# Patient Record
Sex: MALE | Race: WHITE | NOT HISPANIC OR LATINO | URBAN - METROPOLITAN AREA
[De-identification: names, ages, dates, MRNs, and addresses within clinical notes are randomized per-mention and may not be internally consistent; named-entity substitution may affect disease eponyms.]

---

## 2023-01-01 ENCOUNTER — INPATIENT (INPATIENT)
Facility: HOSPITAL | Age: 83
LOS: 5 days | Discharge: HOME | DRG: 982 | End: 2023-01-07
Attending: THORACIC SURGERY (CARDIOTHORACIC VASCULAR SURGERY) | Admitting: THORACIC SURGERY (CARDIOTHORACIC VASCULAR SURGERY)
Payer: MEDICARE

## 2023-01-01 VITALS
HEART RATE: 60 BPM | RESPIRATION RATE: 19 BRPM | SYSTOLIC BLOOD PRESSURE: 128 MMHG | DIASTOLIC BLOOD PRESSURE: 64 MMHG | OXYGEN SATURATION: 96 % | TEMPERATURE: 98 F

## 2023-01-01 DIAGNOSIS — Z98.890 OTHER SPECIFIED POSTPROCEDURAL STATES: Chronic | ICD-10-CM

## 2023-01-01 DIAGNOSIS — I48.0 PAROXYSMAL ATRIAL FIBRILLATION: ICD-10-CM

## 2023-01-01 DIAGNOSIS — J93.83 OTHER PNEUMOTHORAX: ICD-10-CM

## 2023-01-01 DIAGNOSIS — R77.8 OTHER SPECIFIED ABNORMALITIES OF PLASMA PROTEINS: ICD-10-CM

## 2023-01-01 DIAGNOSIS — J18.9 PNEUMONIA, UNSPECIFIED ORGANISM: ICD-10-CM

## 2023-01-01 PROBLEM — Z00.00 ENCOUNTER FOR PREVENTIVE HEALTH EXAMINATION: Status: ACTIVE | Noted: 2023-01-01

## 2023-01-01 LAB
ABO RH CONFIRMATION: SIGNIFICANT CHANGE UP
ALBUMIN SERPL ELPH-MCNC: 3.8 G/DL — SIGNIFICANT CHANGE UP (ref 3.3–5.2)
ALP SERPL-CCNC: 88 U/L — SIGNIFICANT CHANGE UP (ref 40–120)
ALT FLD-CCNC: 12 U/L — SIGNIFICANT CHANGE UP
ANION GAP SERPL CALC-SCNC: 7 MMOL/L — SIGNIFICANT CHANGE UP (ref 5–17)
APPEARANCE UR: CLEAR — SIGNIFICANT CHANGE UP
APTT BLD: 32.6 SEC — SIGNIFICANT CHANGE UP (ref 27.5–35.5)
AST SERPL-CCNC: 19 U/L — SIGNIFICANT CHANGE UP
B PERT IGG+IGM PNL SER: ABNORMAL
BACTERIA # UR AUTO: ABNORMAL
BILIRUB SERPL-MCNC: 0.6 MG/DL — SIGNIFICANT CHANGE UP (ref 0.4–2)
BILIRUB UR-MCNC: NEGATIVE — SIGNIFICANT CHANGE UP
BLD GP AB SCN SERPL QL: SIGNIFICANT CHANGE UP
BUN SERPL-MCNC: 28.3 MG/DL — HIGH (ref 8–20)
CALCIUM SERPL-MCNC: 8.6 MG/DL — SIGNIFICANT CHANGE UP (ref 8.4–10.5)
CHLORIDE SERPL-SCNC: 101 MMOL/L — SIGNIFICANT CHANGE UP (ref 96–108)
CO2 SERPL-SCNC: 28 MMOL/L — SIGNIFICANT CHANGE UP (ref 22–29)
COLOR FLD: ABNORMAL
COLOR SPEC: YELLOW — SIGNIFICANT CHANGE UP
CREAT SERPL-MCNC: 1.1 MG/DL — SIGNIFICANT CHANGE UP (ref 0.5–1.3)
D DIMER BLD IA.RAPID-MCNC: 440 NG/ML DDU — HIGH
DIFF PNL FLD: NEGATIVE — SIGNIFICANT CHANGE UP
EGFR: 67 ML/MIN/1.73M2 — SIGNIFICANT CHANGE UP
EOSINOPHIL # FLD: 1 % — SIGNIFICANT CHANGE UP
EPI CELLS # UR: SIGNIFICANT CHANGE UP
FLUID INTAKE SUBSTANCE CLASS: SIGNIFICANT CHANGE UP
GLUCOSE SERPL-MCNC: 97 MG/DL — SIGNIFICANT CHANGE UP (ref 70–99)
GLUCOSE UR QL: NEGATIVE MG/DL — SIGNIFICANT CHANGE UP
GRAM STN FLD: SIGNIFICANT CHANGE UP
HCT VFR BLD CALC: 24.8 % — LOW (ref 39–50)
HCT VFR BLD CALC: 27.2 % — LOW (ref 39–50)
HGB BLD-MCNC: 8.1 G/DL — LOW (ref 13–17)
HGB BLD-MCNC: 8.8 G/DL — LOW (ref 13–17)
INR BLD: 1.24 RATIO — HIGH (ref 0.88–1.16)
KETONES UR-MCNC: NEGATIVE — SIGNIFICANT CHANGE UP
LACTATE SERPL-SCNC: 0.7 MMOL/L — SIGNIFICANT CHANGE UP (ref 0.5–2)
LEUKOCYTE ESTERASE UR-ACNC: ABNORMAL
LYMPHOCYTES # FLD: 8 % — SIGNIFICANT CHANGE UP
MAGNESIUM SERPL-MCNC: 2.3 MG/DL — SIGNIFICANT CHANGE UP (ref 1.8–2.6)
MCHC RBC-ENTMCNC: 31.2 PG — SIGNIFICANT CHANGE UP (ref 27–34)
MCHC RBC-ENTMCNC: 32.1 PG — SIGNIFICANT CHANGE UP (ref 27–34)
MCHC RBC-ENTMCNC: 32.4 GM/DL — SIGNIFICANT CHANGE UP (ref 32–36)
MCHC RBC-ENTMCNC: 32.7 GM/DL — SIGNIFICANT CHANGE UP (ref 32–36)
MCV RBC AUTO: 96.5 FL — SIGNIFICANT CHANGE UP (ref 80–100)
MCV RBC AUTO: 98.4 FL — SIGNIFICANT CHANGE UP (ref 80–100)
MESOTHL CELL # FLD: 2 % — SIGNIFICANT CHANGE UP
MONOS+MACROS # FLD: 11 % — SIGNIFICANT CHANGE UP
NEUTROPHILS-BODY FLUID: 78 % — SIGNIFICANT CHANGE UP
NITRITE UR-MCNC: NEGATIVE — SIGNIFICANT CHANGE UP
PH FLD: 8 — SIGNIFICANT CHANGE UP
PH UR: 5 — SIGNIFICANT CHANGE UP (ref 5–8)
PLATELET # BLD AUTO: 175 K/UL — SIGNIFICANT CHANGE UP (ref 150–400)
PLATELET # BLD AUTO: 204 K/UL — SIGNIFICANT CHANGE UP (ref 150–400)
POTASSIUM SERPL-MCNC: 4.7 MMOL/L — SIGNIFICANT CHANGE UP (ref 3.5–5.3)
POTASSIUM SERPL-SCNC: 4.7 MMOL/L — SIGNIFICANT CHANGE UP (ref 3.5–5.3)
PROCALCITONIN SERPL-MCNC: 0.06 NG/ML — SIGNIFICANT CHANGE UP (ref 0.02–0.1)
PROT SERPL-MCNC: 6.1 G/DL — LOW (ref 6.6–8.7)
PROT UR-MCNC: NEGATIVE — SIGNIFICANT CHANGE UP
PROTHROM AB SERPL-ACNC: 14.4 SEC — HIGH (ref 10.5–13.4)
RBC # BLD: 2.52 M/UL — LOW (ref 4.2–5.8)
RBC # BLD: 2.82 M/UL — LOW (ref 4.2–5.8)
RBC # FLD: 14.4 % — SIGNIFICANT CHANGE UP (ref 10.3–14.5)
RBC # FLD: 15.9 % — HIGH (ref 10.3–14.5)
RBC CASTS # UR COMP ASSIST: SIGNIFICANT CHANGE UP /HPF (ref 0–4)
RCV VOL RI: HIGH /UL (ref 0–0)
SODIUM SERPL-SCNC: 136 MMOL/L — SIGNIFICANT CHANGE UP (ref 135–145)
SP GR SPEC: 1.02 — SIGNIFICANT CHANGE UP (ref 1.01–1.02)
SPECIMEN SOURCE: SIGNIFICANT CHANGE UP
TOTAL NUCLEATED CELL COUNT, BODY FLUID: 3489 /UL — SIGNIFICANT CHANGE UP
TROPONIN T SERPL-MCNC: 0.01 NG/ML — SIGNIFICANT CHANGE UP (ref 0–0.06)
TSH SERPL-MCNC: 2.59 UIU/ML — SIGNIFICANT CHANGE UP (ref 0.27–4.2)
TUBE TYPE: SIGNIFICANT CHANGE UP
UROBILINOGEN FLD QL: NEGATIVE MG/DL — SIGNIFICANT CHANGE UP
WBC # BLD: 10.14 K/UL — SIGNIFICANT CHANGE UP (ref 3.8–10.5)
WBC # BLD: 8.23 K/UL — SIGNIFICANT CHANGE UP (ref 3.8–10.5)
WBC # FLD AUTO: 10.14 K/UL — SIGNIFICANT CHANGE UP (ref 3.8–10.5)
WBC # FLD AUTO: 8.23 K/UL — SIGNIFICANT CHANGE UP (ref 3.8–10.5)
WBC UR QL: ABNORMAL /HPF (ref 0–5)

## 2023-01-01 PROCEDURE — 93010 ELECTROCARDIOGRAM REPORT: CPT

## 2023-01-01 PROCEDURE — 32551 INSERTION OF CHEST TUBE: CPT | Mod: LT

## 2023-01-01 PROCEDURE — ZZZZZ: CPT

## 2023-01-01 PROCEDURE — 71045 X-RAY EXAM CHEST 1 VIEW: CPT | Mod: 26,76

## 2023-01-01 PROCEDURE — 99222 1ST HOSP IP/OBS MODERATE 55: CPT

## 2023-01-01 RX ORDER — FUROSEMIDE 40 MG
20 TABLET ORAL DAILY
Refills: 0 | Status: DISCONTINUED | OUTPATIENT
Start: 2023-01-01 | End: 2023-01-02

## 2023-01-01 RX ORDER — SODIUM CHLORIDE 9 MG/ML
3 INJECTION INTRAMUSCULAR; INTRAVENOUS; SUBCUTANEOUS EVERY 8 HOURS
Refills: 0 | Status: DISCONTINUED | OUTPATIENT
Start: 2023-01-01 | End: 2023-01-07

## 2023-01-01 RX ORDER — LIDOCAINE HCL 20 MG/ML
10 VIAL (ML) INJECTION ONCE
Refills: 0 | Status: COMPLETED | OUTPATIENT
Start: 2023-01-01 | End: 2023-01-01

## 2023-01-01 RX ORDER — ACETAMINOPHEN 500 MG
650 TABLET ORAL EVERY 6 HOURS
Refills: 0 | Status: DISCONTINUED | OUTPATIENT
Start: 2023-01-01 | End: 2023-01-07

## 2023-01-01 RX ORDER — OXYCODONE HYDROCHLORIDE 5 MG/1
5 TABLET ORAL EVERY 6 HOURS
Refills: 0 | Status: DISCONTINUED | OUTPATIENT
Start: 2023-01-01 | End: 2023-01-07

## 2023-01-01 RX ORDER — INFLUENZA VIRUS VACCINE 15; 15; 15; 15 UG/.5ML; UG/.5ML; UG/.5ML; UG/.5ML
0.7 SUSPENSION INTRAMUSCULAR ONCE
Refills: 0 | Status: DISCONTINUED | OUTPATIENT
Start: 2023-01-01 | End: 2023-01-07

## 2023-01-01 RX ORDER — SIMVASTATIN 20 MG/1
1 TABLET, FILM COATED ORAL
Qty: 0 | Refills: 0 | DISCHARGE

## 2023-01-01 RX ORDER — LISINOPRIL 2.5 MG/1
20 TABLET ORAL DAILY
Refills: 0 | Status: DISCONTINUED | OUTPATIENT
Start: 2023-01-01 | End: 2023-01-01

## 2023-01-01 RX ORDER — SIMVASTATIN 20 MG/1
20 TABLET, FILM COATED ORAL AT BEDTIME
Refills: 0 | Status: DISCONTINUED | OUTPATIENT
Start: 2023-01-01 | End: 2023-01-07

## 2023-01-01 RX ORDER — ACETAMINOPHEN 500 MG
1000 TABLET ORAL ONCE
Refills: 0 | Status: COMPLETED | OUTPATIENT
Start: 2023-01-01 | End: 2023-01-01

## 2023-01-01 RX ORDER — HYDROMORPHONE HYDROCHLORIDE 2 MG/ML
0.5 INJECTION INTRAMUSCULAR; INTRAVENOUS; SUBCUTANEOUS ONCE
Refills: 0 | Status: DISCONTINUED | OUTPATIENT
Start: 2023-01-01 | End: 2023-01-01

## 2023-01-01 RX ORDER — LISINOPRIL 2.5 MG/1
20 TABLET ORAL
Refills: 0 | Status: DISCONTINUED | OUTPATIENT
Start: 2023-01-01 | End: 2023-01-02

## 2023-01-01 RX ORDER — AMLODIPINE BESYLATE 2.5 MG/1
5 TABLET ORAL DAILY
Refills: 0 | Status: DISCONTINUED | OUTPATIENT
Start: 2023-01-01 | End: 2023-01-02

## 2023-01-01 RX ORDER — PANTOPRAZOLE SODIUM 20 MG/1
40 TABLET, DELAYED RELEASE ORAL
Refills: 0 | Status: DISCONTINUED | OUTPATIENT
Start: 2023-01-01 | End: 2023-01-07

## 2023-01-01 RX ORDER — LIDOCAINE 4 G/100G
1 CREAM TOPICAL ONCE
Refills: 0 | Status: COMPLETED | OUTPATIENT
Start: 2023-01-01 | End: 2023-01-01

## 2023-01-01 RX ADMIN — LISINOPRIL 20 MILLIGRAM(S): 2.5 TABLET ORAL at 17:07

## 2023-01-01 RX ADMIN — Medication 20 MILLIGRAM(S): at 15:18

## 2023-01-01 RX ADMIN — OXYCODONE HYDROCHLORIDE 5 MILLIGRAM(S): 5 TABLET ORAL at 17:46

## 2023-01-01 RX ADMIN — AMLODIPINE BESYLATE 5 MILLIGRAM(S): 2.5 TABLET ORAL at 11:45

## 2023-01-01 RX ADMIN — HYDROMORPHONE HYDROCHLORIDE 0.5 MILLIGRAM(S): 2 INJECTION INTRAMUSCULAR; INTRAVENOUS; SUBCUTANEOUS at 19:13

## 2023-01-01 RX ADMIN — LIDOCAINE 1 PATCH: 4 CREAM TOPICAL at 18:59

## 2023-01-01 RX ADMIN — Medication 650 MILLIGRAM(S): at 12:59

## 2023-01-01 RX ADMIN — SIMVASTATIN 20 MILLIGRAM(S): 20 TABLET, FILM COATED ORAL at 21:18

## 2023-01-01 RX ADMIN — SODIUM CHLORIDE 3 MILLILITER(S): 9 INJECTION INTRAMUSCULAR; INTRAVENOUS; SUBCUTANEOUS at 13:13

## 2023-01-01 RX ADMIN — Medication 1000 MILLIGRAM(S): at 19:29

## 2023-01-01 RX ADMIN — Medication 400 MILLIGRAM(S): at 19:14

## 2023-01-01 RX ADMIN — OXYCODONE HYDROCHLORIDE 5 MILLIGRAM(S): 5 TABLET ORAL at 16:46

## 2023-01-01 RX ADMIN — LIDOCAINE 1 PATCH: 4 CREAM TOPICAL at 17:54

## 2023-01-01 RX ADMIN — Medication 650 MILLIGRAM(S): at 13:59

## 2023-01-01 RX ADMIN — SODIUM CHLORIDE 3 MILLILITER(S): 9 INJECTION INTRAMUSCULAR; INTRAVENOUS; SUBCUTANEOUS at 21:16

## 2023-01-01 RX ADMIN — Medication 10 MILLILITER(S): at 10:46

## 2023-01-01 RX ADMIN — HYDROMORPHONE HYDROCHLORIDE 0.5 MILLIGRAM(S): 2 INJECTION INTRAMUSCULAR; INTRAVENOUS; SUBCUTANEOUS at 19:29

## 2023-01-01 RX ADMIN — PANTOPRAZOLE SODIUM 40 MILLIGRAM(S): 20 TABLET, DELAYED RELEASE ORAL at 11:45

## 2023-01-01 NOTE — CONSULT NOTE ADULT - ASSESSMENT
82M presented to [facility] via Ambulance on 12/29 with acute onset shortness of breath and left sided chest pain radiating down left arm.  He was hypoxic on admission requiring 3L NC, and relatively hypotensive with SBP in the 90's according to [place] chart.  Cardiac workup revealed mildly elevated troponin likely secondary to demand ischemia in the setting of what was presumed pneumonia, with no abnormality on EKG except for RBBB and rate controlled afib.  CTangio chest r/o PE but revealed left sided effusion for which a thoracentesis was performed, yielding 300cc of jd blood.  Thoracic surgery was consulted and the patient was transferred to [facility] for management of spontaneous hemothorax.  He is on eliquis for chronic persistent atrial fibrillation.  He denies any sort of trauma to account for an etiology of this hemothorax (he was sitting down drinking coffee when initial symptoms began).     Appreciate above HPI. 82 y/op M with PMH HTN, heart failure unspecified, HLD, mitral ring, Afib on EliquisPt sitting up in bed, awaiting chest tube by CTsx. States history of MVR, possible clip to [place]. States no history of heart attack, stent, PCI in past.

## 2023-01-01 NOTE — H&P ADULT - HISTORY OF PRESENT ILLNESS
82M presented to [facility] via Ambulance on 12/29 with acute onset shortness of breath and left sided chest pain radiating down left arm.  He was hypoxic on admission requiring 3L NC, and relatively hypotensive with SBP in the 90's according to [place] chart.   Cardiac workup revealed mildly elevated troponin likely secondary to demand ischemia in the setting of what was presumed pneumonia, with no abnormality on EKG except for RBBB and rate controlled afib.  CTangio chest r/o PE but revealed left sided effusion for which a thoracentesis was performed, yielding 300cc of jd blood.  Thoracic surgery was consulted and the patient was transferred to [facility] for management of spontaneous hemothorax.  He is on eliquis for chronic persistent atrial fibrillation.  He denies any sort of trauma to account for an etiology of this hemothorax (he was sitting down drinking coffee when initial symptoms began).  He denies back pain, abdominal pain, dyspepsia, headache, syncope, visual changes, motor dysfunction/weakness, dysuria, dysphagia, cough, hemoptysis, palpitations, N/V/D.  He is currently in no acute distress, with 1/10 chest pain, and no SOB at rest.

## 2023-01-01 NOTE — H&P ADULT - NSHPLABSRESULTS_GEN_ALL_CORE
Verbal report on ECHO from [facility] - possible mitral valve abnormality, ?vegetation  partially loculated L effusion on CT Chest

## 2023-01-01 NOTE — CONSULT NOTE ADULT - PROBLEM SELECTOR RECOMMENDATION 2
.  - SR/SB on monitor   - no BB 2/2 bradycardia   - Previously on Eliquis, now with spontaneous hemothorax   - CHADSVASC (age, HTN) 3.   - would resume when cleared by thoracic surgery

## 2023-01-01 NOTE — CONSULT NOTE ADULT - NS ATTEND AMEND GEN_ALL_CORE FT
seen with above,    82M history significant for HTN, HLD, MV repair (at [facility] with Dr. Ayoub), chronic Afib (on Eliquis) follows with outside cardiologists Dr. Cox/Sanjuana at [facility], presents to [facility] with acute dyspnea/L-chest/arm pain found with large hemothorax, reportedly had elevated Troponin, transfer here for thoracic surgery eval. s/p chest tube with significant bloody fluid  -CHADSVasc 3, no prior CVA, need to hold AC in the interim as unexplained spontaneous hemothorax, consider future elective TOOTIE occlusion procedure; patient wants to switch to different cardiologist can follow up with [facility] closer to where he lives  -Troponin normal, EKG with RBBB, defer ischemic workup to outpatient when hemothorax resolve  -TTE pending, if normal then no cardiac contraindication if need OR for decortication  -reconsult if new cardiac issue arise         Poncho Rollins DO, [facility]  Faculty Non-Invasive Cardiologist  [phone]

## 2023-01-01 NOTE — H&P ADULT - ASSESSMENT
82M transferred from [facility] with spontaneous hemothorax on eliquis.    -Admit to Dr. Barney's service  -labs, ekg, cxr,   -chest tube insertion for drainage of hemothorax this AM  -Monitor H/H  -repeat echo to better characterize ventricular function and mitral valve.    -will discuss with thoracic team in AM

## 2023-01-01 NOTE — PROCEDURE NOTE - NSINFORMCONSENT_GEN_A_CORE
Benefits, risks, and possible complications of procedure explained to patient/caregiver who verbalized understanding and gave written consent.
weight-bearing as tolerated

## 2023-01-01 NOTE — CONSULT NOTE ADULT - PROBLEM SELECTOR RECOMMENDATION 9
.  - elevated troponin at [facility]  - Troponin here neg  - would send repeat trop with CK  - EKG- non ischemic   - follows with Dr. Cox [place] cardiology   - given spontaneous pneumothorax, will defer further ischemic work up at this time.   - follow up with Dr. Cox after discharge  - cont statin

## 2023-01-01 NOTE — CONSULT NOTE ADULT - SUBJECTIVE AND OBJECTIVE BOX
[facility]                                              CARDIOLOGY AT [facility]                                                   [address], [place] [address]                                             Telephone: [phone]. Fax:[phone]                                                       CARDIOLOGY CONSULTATION NOTE                                                                                             History obtained by: Patient and medical record  Community Cardiologist: Joe Campa)  Reason for Consultation: Demand Ischemia   Available out pt records reviewed: Yes [  ] No [ x ]      Chief complaint:    Patient is a 82y old  Male who presents with a chief complaint of Hemothorax (2023 02:08)      HPI:  82M presented to [facility] via Ambulance on  with acute onset shortness of breath and left sided chest pain radiating down left arm.  He was hypoxic on admission requiring 3L NC, and relatively hypotensive with SBP in the 90's according to [place] chart.  Cardiac workup revealed mildly elevated troponin likely secondary to demand ischemia in the setting of what was presumed pneumonia, with no abnormality on EKG except for RBBB and rate controlled afib.  CTangio chest r/o PE but revealed left sided effusion for which a thoracentesis was performed, yielding 300cc of jd blood.  Thoracic surgery was consulted and the patient was transferred to [facility] for management of spontaneous hemothorax.  He is on eliquis for chronic persistent atrial fibrillation.  He denies any sort of trauma to account for an etiology of this hemothorax (he was sitting down drinking coffee when initial symptoms began).     Appreciate above HPI. 82 y/op M with PMH HTN, heart failure unspecified, HLD, mitral ring, Afib on EliquisPt sitting up in bed, awaiting chest tube by CTsx. States history of MVR, possible clip to [place]. States no history of heart attack, stent, PCI in past.         PAST MEDICAL HISTORY  HTN (hypertension)  HLD (hyperlipidemia)        PAST SURGICAL HISTORY  H/O mitral valve repair  H/O hernia repair      SOCIAL HISTORY:  Denies smoking/drugs  bourbon once a week       FAMILY HISTORY:  denies         HOME MEDICATIONS:  Eliquis 5 mg oral tablet: 1 tab(s) orally 2 times a day (2023 08:03)  Lasix 20 mg oral tablet: 1 tab(s) orally once a day (2023 08:03)  lisinopril 20 mg oral tablet: 1 tab(s) orally 2 times a day (2023 08:03)  Norvasc 5 mg oral tablet: 1 tab(s) orally once a day (2023 08:03)  oxyCODONE 5 mg oral tablet: 1 tab(s) orally every 6 hours (2023 08:03)  simvastatin 20 mg oral tablet: 1 tab(s) orally once a day (at bedtime) (2023 08:03)      CURRENT CARDIAC MEDICATIONS:  amLODIPine Tablet 5 milliGRAM(s) Oral daily  furosemide Tablet 20 milliGRAM(s) Oral daily  lisinopril 20 milliGRAM(s) Oral two times a day      CURRENT OTHER MEDICATIONS:  acetaminophen Tablet .. 650 milliGRAM(s) Oral every 6 hours PRN Temp greater or equal to 38.5C (101.3F), Mild Pain (1 - 3)  oxyCODONE IR 5 milliGRAM(s) Oral every 6 hours PRN Severe Pain (7 - 10)  pantoprazole Tablet 40 milliGRAM(s) Oral before breakfast  influenza Vaccine (HIGH DOSE) 0.7 milliLiter(s) IntraMuscular once  lidocaine 1% Injectable 10 milliLiter(s) Local Injection once, Stop order after: 1 Doses  simvastatin 20 milliGRAM(s) Oral at bedtime  sodium chloride 0.9% lock flush 3 milliLiter(s) IV Push every 8 hours        ALLERGIES:   spironolactone (Rash)        REVIEW OF SYMPTOMS:   CONSTITUTIONAL: No fever, no chills, no weight loss, no weight gain, no fatigue   ENMT:  No vertigo; No sinus or throat pain  NECK: No pain or stiffness  CARDIOVASCULAR: No chest pain, + dyspnea, no syncope/presyncope, no palpitations, no dizziness  RESPIRATORY: + Shortness of breath, no cough, no wheezing  : No dysuria, no hematuria   GI: No dark color stool, no nausea, no diarrhea, no constipation, no abdominal pain   NEURO: No headache, no slurred speech   MUSCULOSKELETAL: No joint pain or swelling; No muscle, back, or extremity pain  PSYCH: No agitation, no anxiety.    ALL OTHER REVIEW OF SYSTEMS ARE NEGATIVE.        VITAL SIGNS:  T(C): 36.6 (23 @ 08:30), Max: 36.8 (23 @ 01:13)  T(F): 97.9 (23 @ 08:30), Max: 98.3 (23 @ 01:13)  HR: 61 (23 @ 08:30) (60 - 61)  BP: 131/68 (23 @ 08:30) (128/64 - 131/68)  RR: 18 (23 @ 08:30) (18 - 19)  SpO2: 95% (23 @ 08:30) (95% - 96%)        INTAKE AND OUTPUT:     12-31 @ 07:01  -   @ 07:00  --------------------------------------------------------  IN: 120 mL / OUT: 0 mL / NET: 120 mL        PHYSICAL EXAM:  Constitutional: Comfortable . No acute distress.   HEENT: Atraumatic and normocephalic , neck is supple   CNS: A&Ox3. No focal deficits.   Respiratory: RLL diminished, supplemental o2  Cardiovascular: RRR normal s1 s2. No murmur. No rubs or gallop.  Gastrointestinal: Soft, non-tender. +Bowel sounds.   Extremities: No edema  Psychiatric: Calm . no agitation.   Skin: Warm and dry, no ulcers on extremities         LABS:  ( 2023 03:48 )  Troponin T  0.01 ,  CPK  X    , CKMB  X    , BNP X                              8.1    8.23  )-----------( 175      ( 2023 03:48 )             24.8           136  |  101  |  28.3<H>  ----------------------------<  97  4.7   |  28.0  |  1.10    Ca    8.6      2023 03:48  Mg     2.3         TPro  6.1<L>  /  Alb  3.8  /  TBili  0.6  /  DBili  x   /  AST  19  /  ALT  12  /  AlkPhos  88      PT/INR - ( 2023 03:48 )   PT: 14.4 sec;   INR: 1.24 ratio         PTT - ( 2023 03:48 )  PTT:32.6 sec  Urinalysis Basic - ( 2023 07:30 )    Color: Yellow / Appearance: Clear / S.020 / pH: x  Gluc: x / Ketone: Negative  / Bili: Negative / Urobili: Negative mg/dL   Blood: x / Protein: Negative / Nitrite: Negative   Leuk Esterase: Small / RBC: 0-2 /HPF / WBC 6-10 /HPF   Sq Epi: x / Non Sq Epi: Occasional / Bacteria: Occasional        Thyroid Stimulating Hormone, Serum: 2.59 uIU/mL (23 @ 03:48)        INTERPRETATION OF TELEMETRY: SR, SB, BBB      ECG: undetermined rhythm, Anup rate at 59, BBB

## 2023-01-01 NOTE — CONSULT NOTE ADULT - PROBLEM SELECTOR RECOMMENDATION 3
.  - as per chart note, alfonso thoracentesis with 300cc- hemothorax  - Thoracic following, plan for chest tube today  - requiring supplemental o2, management as per primary team

## 2023-01-02 LAB
ALBUMIN FLD-MCNC: 2.9 G/DL — SIGNIFICANT CHANGE UP
ALBUMIN SERPL ELPH-MCNC: 3.6 G/DL — SIGNIFICANT CHANGE UP (ref 3.3–5.2)
ALP SERPL-CCNC: 83 U/L — SIGNIFICANT CHANGE UP (ref 40–120)
ALT FLD-CCNC: 12 U/L — SIGNIFICANT CHANGE UP
ANION GAP SERPL CALC-SCNC: 11 MMOL/L — SIGNIFICANT CHANGE UP (ref 5–17)
AST SERPL-CCNC: 21 U/L — SIGNIFICANT CHANGE UP
BILIRUB SERPL-MCNC: 1 MG/DL — SIGNIFICANT CHANGE UP (ref 0.4–2)
BUN SERPL-MCNC: 30.4 MG/DL — HIGH (ref 8–20)
CALCIUM SERPL-MCNC: 8.2 MG/DL — LOW (ref 8.4–10.5)
CHLORIDE SERPL-SCNC: 99 MMOL/L — SIGNIFICANT CHANGE UP (ref 96–108)
CO2 SERPL-SCNC: 25 MMOL/L — SIGNIFICANT CHANGE UP (ref 22–29)
CREAT SERPL-MCNC: 1.12 MG/DL — SIGNIFICANT CHANGE UP (ref 0.5–1.3)
EGFR: 65 ML/MIN/1.73M2 — SIGNIFICANT CHANGE UP
GLUCOSE FLD-MCNC: 65 MG/DL — SIGNIFICANT CHANGE UP
GLUCOSE SERPL-MCNC: 105 MG/DL — HIGH (ref 70–99)
HCT VFR BLD CALC: 26.9 % — LOW (ref 39–50)
HGB BLD-MCNC: 8.6 G/DL — LOW (ref 13–17)
LDH SERPL L TO P-CCNC: 224 U/L — HIGH (ref 98–192)
MAGNESIUM SERPL-MCNC: 2.2 MG/DL — SIGNIFICANT CHANGE UP (ref 1.6–2.6)
MCHC RBC-ENTMCNC: 31.2 PG — SIGNIFICANT CHANGE UP (ref 27–34)
MCHC RBC-ENTMCNC: 32 GM/DL — SIGNIFICANT CHANGE UP (ref 32–36)
MCV RBC AUTO: 97.5 FL — SIGNIFICANT CHANGE UP (ref 80–100)
PLATELET # BLD AUTO: 200 K/UL — SIGNIFICANT CHANGE UP (ref 150–400)
POTASSIUM SERPL-MCNC: 4.8 MMOL/L — SIGNIFICANT CHANGE UP (ref 3.5–5.3)
POTASSIUM SERPL-SCNC: 4.8 MMOL/L — SIGNIFICANT CHANGE UP (ref 3.5–5.3)
PROT FLD-MCNC: 4.4 G/DL — SIGNIFICANT CHANGE UP
PROT SERPL-MCNC: 5.9 G/DL — LOW (ref 6.6–8.7)
RBC # BLD: 2.76 M/UL — LOW (ref 4.2–5.8)
RBC # FLD: 16 % — HIGH (ref 10.3–14.5)
SODIUM SERPL-SCNC: 135 MMOL/L — SIGNIFICANT CHANGE UP (ref 135–145)
WBC # BLD: 9.46 K/UL — SIGNIFICANT CHANGE UP (ref 3.8–10.5)
WBC # FLD AUTO: 9.46 K/UL — SIGNIFICANT CHANGE UP (ref 3.8–10.5)

## 2023-01-02 PROCEDURE — 99232 SBSQ HOSP IP/OBS MODERATE 35: CPT

## 2023-01-02 PROCEDURE — 71045 X-RAY EXAM CHEST 1 VIEW: CPT | Mod: 26

## 2023-01-02 PROCEDURE — 93306 TTE W/DOPPLER COMPLETE: CPT | Mod: 26

## 2023-01-02 RX ORDER — SODIUM CHLORIDE 9 MG/ML
500 INJECTION, SOLUTION INTRAVENOUS
Refills: 0 | Status: DISCONTINUED | OUTPATIENT
Start: 2023-01-02 | End: 2023-01-03

## 2023-01-02 RX ORDER — LIDOCAINE 4 G/100G
1 CREAM TOPICAL DAILY
Refills: 0 | Status: DISCONTINUED | OUTPATIENT
Start: 2023-01-02 | End: 2023-01-07

## 2023-01-02 RX ADMIN — SODIUM CHLORIDE 3 MILLILITER(S): 9 INJECTION INTRAMUSCULAR; INTRAVENOUS; SUBCUTANEOUS at 04:50

## 2023-01-02 RX ADMIN — PANTOPRAZOLE SODIUM 40 MILLIGRAM(S): 20 TABLET, DELAYED RELEASE ORAL at 04:52

## 2023-01-02 RX ADMIN — SODIUM CHLORIDE 500 MILLILITER(S): 9 INJECTION, SOLUTION INTRAVENOUS at 01:56

## 2023-01-02 RX ADMIN — OXYCODONE HYDROCHLORIDE 5 MILLIGRAM(S): 5 TABLET ORAL at 23:45

## 2023-01-02 RX ADMIN — OXYCODONE HYDROCHLORIDE 5 MILLIGRAM(S): 5 TABLET ORAL at 22:45

## 2023-01-02 RX ADMIN — SIMVASTATIN 20 MILLIGRAM(S): 20 TABLET, FILM COATED ORAL at 20:05

## 2023-01-02 RX ADMIN — SODIUM CHLORIDE 3 MILLILITER(S): 9 INJECTION INTRAMUSCULAR; INTRAVENOUS; SUBCUTANEOUS at 13:20

## 2023-01-02 RX ADMIN — SODIUM CHLORIDE 3 MILLILITER(S): 9 INJECTION INTRAMUSCULAR; INTRAVENOUS; SUBCUTANEOUS at 20:01

## 2023-01-02 RX ADMIN — LIDOCAINE 1 PATCH: 4 CREAM TOPICAL at 04:54

## 2023-01-02 NOTE — PROGRESS NOTE ADULT - SUBJECTIVE AND OBJECTIVE BOX
82y Male s/p Chest tube insertion        Subjective: in a lot pf pain at the chest tube site earlier, responded well to tylenol and dilaudid.  Currently in NAD.  HR noted to be in 40/s while asleep.  SBP in 80's.  Denies dizziness, nausea, chest pain, abd pain, visual changes.      T(C): 36.6 (01-01-23 @ 20:51), Max: 37 (01-01-23 @ 12:00)  HR: 46 (01-02-23 @ 01:47) (46 - 71)  BP: 82/50 (01-02-23 @ 01:47) (82/50 - 143/73)  ABP: --  ABP(mean): --  RR: 17 (01-01-23 @ 20:51) (17 - 25)  SpO2: 95% (01-01-23 @ 20:51) (95% - 99%)  Wt(kg): --  CVP(mm Hg): --  CO: --  CI: --  PA: --         01-01    136  |  101  |  28.3<H>  ----------------------------<  97  4.7   |  28.0  |  1.10    Ca    8.6      01 Jan 2023 03:48  Mg     2.3     01-01    TPro  6.1<L>  /  Alb  3.8  /  TBili  0.6  /  DBili  x   /  AST  19  /  ALT  12  /  AlkPhos  88  01-01                               8.8    10.14 )-----------( 204      ( 01 Jan 2023 18:04 )             27.2        PT/INR - ( 01 Jan 2023 03:48 )   PT: 14.4 sec;   INR: 1.24 ratio         PTT - ( 01 Jan 2023 03:48 )  PTT:32.6 sec       CARDIAC MARKERS ( 01 Jan 2023 03:48 )  CKx     / CKMBx     / Troponin T0.01 ng/mL /        CAPILLARY BLOOD GLUCOSE               CXR:    I&O's Detail    31 Dec 2022 07:01  -  01 Jan 2023 07:00  --------------------------------------------------------  IN:    Oral Fluid: 120 mL  Total IN: 120 mL    OUT:  Total OUT: 0 mL    Total NET: 120 mL      01 Jan 2023 07:01  -  02 Jan 2023 02:00  --------------------------------------------------------  IN:    Oral Fluid: 600 mL    PRBCs (Packed Red Blood Cells): 287 mL  Total IN: 887 mL    OUT:    Chest Tube (mL): 1900 mL    Voided (mL): 175 mL  Total OUT: 2075 mL    Total NET: -1188 mL          MEDICATIONS  (STANDING):  furosemide    Tablet 20 milliGRAM(s) Oral daily  influenza  Vaccine (HIGH DOSE) 0.7 milliLiter(s) IntraMuscular once  lactated ringers. 500 milliLiter(s) (500 mL/Hr) IV Continuous <Continuous>  pantoprazole    Tablet 40 milliGRAM(s) Oral before breakfast  simvastatin 20 milliGRAM(s) Oral at bedtime  sodium chloride 0.9% lock flush 3 milliLiter(s) IV Push every 8 hours    MEDICATIONS  (PRN):  acetaminophen     Tablet .. 650 milliGRAM(s) Oral every 6 hours PRN Temp greater or equal to 38.5C (101.3F), Mild Pain (1 - 3)  oxyCODONE    IR 5 milliGRAM(s) Oral every 6 hours PRN Severe Pain (7 - 10)      Physical Exam  Neuro: A+O x 3, non-focal, speech clear and intact  Pulm: CTA, equal bilaterally L pigtail in place draining serosanguinous fluid, no air leak  CV: RRR, +S1S2 dudley  Abd: soft, NT, ND, +BS  Ext: FIELDS x 4, no edema    -----------------------------------------------------------------------------------------------------------------------------------------------------------------------------  -----------------------------------------------------------------------------------------------------------------------------------------------------------------------------

## 2023-01-02 NOTE — PROGRESS NOTE ADULT - ASSESSMENT
82M transferred from [facility] with spontaneous hemothorax on eliquis, s/p chest tube, now mildly hypotensive secondary to acute blood loss and resumption of antihypertensives.  -hold lisinopril and norvasc  -send AM labs now  -500cc LR bolus  -f/u BP after bolus  -will likely need additional PRBC today       82M transferred from [facility] with spontaneous hemothorax on eliquis, s/p chest tube, now mildly hypotensive secondary to hypovolemia from acute blood loss and resumption of antihypertensives.  -hold lisinopril and norvasc  -send AM labs now  -500cc LR bolus  -f/u BP after bolus  -will likely need additional PRBC today

## 2023-01-03 ENCOUNTER — RESULT REVIEW (OUTPATIENT)
Age: 83
End: 2023-01-03

## 2023-01-03 DIAGNOSIS — J94.2 HEMOTHORAX: ICD-10-CM

## 2023-01-03 LAB
ALBUMIN SERPL ELPH-MCNC: 3.2 G/DL — LOW (ref 3.3–5.2)
ALP SERPL-CCNC: 82 U/L — SIGNIFICANT CHANGE UP (ref 40–120)
ALT FLD-CCNC: 11 U/L — SIGNIFICANT CHANGE UP
ANION GAP SERPL CALC-SCNC: 8 MMOL/L — SIGNIFICANT CHANGE UP (ref 5–17)
AST SERPL-CCNC: 21 U/L — SIGNIFICANT CHANGE UP
BILIRUB SERPL-MCNC: 0.6 MG/DL — SIGNIFICANT CHANGE UP (ref 0.4–2)
BUN SERPL-MCNC: 43.9 MG/DL — HIGH (ref 8–20)
CALCIUM SERPL-MCNC: 8.4 MG/DL — SIGNIFICANT CHANGE UP (ref 8.4–10.5)
CHLORIDE SERPL-SCNC: 102 MMOL/L — SIGNIFICANT CHANGE UP (ref 96–108)
CO2 SERPL-SCNC: 27 MMOL/L — SIGNIFICANT CHANGE UP (ref 22–29)
CREAT SERPL-MCNC: 1.39 MG/DL — HIGH (ref 0.5–1.3)
EGFR: 50 ML/MIN/1.73M2 — LOW
GLUCOSE SERPL-MCNC: 96 MG/DL — SIGNIFICANT CHANGE UP (ref 70–99)
HCT VFR BLD CALC: 25.5 % — LOW (ref 39–50)
HCT VFR BLD CALC: 27.8 % — LOW (ref 39–50)
HGB BLD-MCNC: 8.2 G/DL — LOW (ref 13–17)
HGB BLD-MCNC: 9 G/DL — LOW (ref 13–17)
LDH SERPL L TO P-CCNC: 427 U/L — SIGNIFICANT CHANGE UP
MAGNESIUM SERPL-MCNC: 2.5 MG/DL — SIGNIFICANT CHANGE UP (ref 1.6–2.6)
MCHC RBC-ENTMCNC: 30.9 PG — SIGNIFICANT CHANGE UP (ref 27–34)
MCHC RBC-ENTMCNC: 31.2 PG — SIGNIFICANT CHANGE UP (ref 27–34)
MCHC RBC-ENTMCNC: 32.2 GM/DL — SIGNIFICANT CHANGE UP (ref 32–36)
MCHC RBC-ENTMCNC: 32.4 GM/DL — SIGNIFICANT CHANGE UP (ref 32–36)
MCV RBC AUTO: 95.5 FL — SIGNIFICANT CHANGE UP (ref 80–100)
MCV RBC AUTO: 97 FL — SIGNIFICANT CHANGE UP (ref 80–100)
PLATELET # BLD AUTO: 198 K/UL — SIGNIFICANT CHANGE UP (ref 150–400)
PLATELET # BLD AUTO: 220 K/UL — SIGNIFICANT CHANGE UP (ref 150–400)
POTASSIUM SERPL-MCNC: 4.4 MMOL/L — SIGNIFICANT CHANGE UP (ref 3.5–5.3)
POTASSIUM SERPL-SCNC: 4.4 MMOL/L — SIGNIFICANT CHANGE UP (ref 3.5–5.3)
PROT SERPL-MCNC: 5.8 G/DL — LOW (ref 6.6–8.7)
RBC # BLD: 2.63 M/UL — LOW (ref 4.2–5.8)
RBC # BLD: 2.91 M/UL — LOW (ref 4.2–5.8)
RBC # FLD: 15.8 % — HIGH (ref 10.3–14.5)
RBC # FLD: 15.9 % — HIGH (ref 10.3–14.5)
SODIUM SERPL-SCNC: 136 MMOL/L — SIGNIFICANT CHANGE UP (ref 135–145)
WBC # BLD: 7.78 K/UL — SIGNIFICANT CHANGE UP (ref 3.8–10.5)
WBC # BLD: 8.66 K/UL — SIGNIFICANT CHANGE UP (ref 3.8–10.5)
WBC # FLD AUTO: 7.78 K/UL — SIGNIFICANT CHANGE UP (ref 3.8–10.5)
WBC # FLD AUTO: 8.66 K/UL — SIGNIFICANT CHANGE UP (ref 3.8–10.5)

## 2023-01-03 PROCEDURE — 99231 SBSQ HOSP IP/OBS SF/LOW 25: CPT

## 2023-01-03 PROCEDURE — 88112 CYTOPATH CELL ENHANCE TECH: CPT | Mod: 26

## 2023-01-03 PROCEDURE — 88305 TISSUE EXAM BY PATHOLOGIST: CPT | Mod: 26

## 2023-01-03 PROCEDURE — 71250 CT THORAX DX C-: CPT | Mod: 26

## 2023-01-03 RX ORDER — FERROUS SULFATE 325(65) MG
325 TABLET ORAL DAILY
Refills: 0 | Status: DISCONTINUED | OUTPATIENT
Start: 2023-01-03 | End: 2023-01-07

## 2023-01-03 RX ORDER — FOLIC ACID 0.8 MG
1 TABLET ORAL DAILY
Refills: 0 | Status: DISCONTINUED | OUTPATIENT
Start: 2023-01-03 | End: 2023-01-07

## 2023-01-03 RX ADMIN — LIDOCAINE 1 PATCH: 4 CREAM TOPICAL at 13:00

## 2023-01-03 RX ADMIN — PANTOPRAZOLE SODIUM 40 MILLIGRAM(S): 20 TABLET, DELAYED RELEASE ORAL at 04:41

## 2023-01-03 RX ADMIN — SODIUM CHLORIDE 3 MILLILITER(S): 9 INJECTION INTRAMUSCULAR; INTRAVENOUS; SUBCUTANEOUS at 04:24

## 2023-01-03 RX ADMIN — SODIUM CHLORIDE 3 MILLILITER(S): 9 INJECTION INTRAMUSCULAR; INTRAVENOUS; SUBCUTANEOUS at 21:17

## 2023-01-03 RX ADMIN — LIDOCAINE 1 PATCH: 4 CREAM TOPICAL at 20:17

## 2023-01-03 RX ADMIN — OXYCODONE HYDROCHLORIDE 5 MILLIGRAM(S): 5 TABLET ORAL at 23:00

## 2023-01-03 RX ADMIN — SODIUM CHLORIDE 3 MILLILITER(S): 9 INJECTION INTRAMUSCULAR; INTRAVENOUS; SUBCUTANEOUS at 13:06

## 2023-01-03 RX ADMIN — Medication 650 MILLIGRAM(S): at 13:18

## 2023-01-03 RX ADMIN — SIMVASTATIN 20 MILLIGRAM(S): 20 TABLET, FILM COATED ORAL at 22:39

## 2023-01-03 RX ADMIN — OXYCODONE HYDROCHLORIDE 5 MILLIGRAM(S): 5 TABLET ORAL at 22:39

## 2023-01-03 RX ADMIN — Medication 650 MILLIGRAM(S): at 12:59

## 2023-01-03 NOTE — PROGRESS NOTE ADULT - ASSESSMENT
82M transferred from [facility] with spontaneous hemothorax on eliquis, s/p chest tube, now mildly hypotensive secondary to hypovolemia from acute blood loss and resumption of antihypertensives.

## 2023-01-03 NOTE — PROGRESS NOTE ADULT - PROBLEM SELECTOR PLAN 1
spontaneous hemothorax, non traumatic per patient  s/p Left PTC placement  Maintain to H2O seal  Daily CXR while PTC in place  Record drainage q12 hours  f/u pleural fluid analysis, culture and cytology  Appreciate cardiology consult, no ischemic work up at this time  Will discuss timing of restarting Eliquis  Plan to be discussed with Thoracic Surgery team in AM rounds

## 2023-01-03 NOTE — CDI QUERY NOTE - NSCDIOTHERTXTBX_GEN_ALL_CORE_HH
H&P- He is on eliquis for chronic persistent atrial fibrillation    Cardio consult- ·  Problem: Paroxysmal atrial fibrillation.   ·  Recommendation: .  - SR/SB on monitor   - no BB 2/2 bradycardia   - Previously on Eliquis, now with spontaneous hemothorax   82M history significant for HTN, HLD, MV repair (at [facility] with Dr. Ayoub), chronic Afib (on Eliquis)     Please clarify type of Afib    - Chronic atrial fibrillation  - Paroxysmal atrial fibrillation  - Other ( please specify)  - Undetermined

## 2023-01-04 LAB
ANION GAP SERPL CALC-SCNC: 10 MMOL/L — SIGNIFICANT CHANGE UP (ref 5–17)
BUN SERPL-MCNC: 44.3 MG/DL — HIGH (ref 8–20)
CALCIUM SERPL-MCNC: 8.4 MG/DL — SIGNIFICANT CHANGE UP (ref 8.4–10.5)
CHLORIDE SERPL-SCNC: 100 MMOL/L — SIGNIFICANT CHANGE UP (ref 96–108)
CO2 SERPL-SCNC: 26 MMOL/L — SIGNIFICANT CHANGE UP (ref 22–29)
CREAT SERPL-MCNC: 1.17 MG/DL — SIGNIFICANT CHANGE UP (ref 0.5–1.3)
EGFR: 62 ML/MIN/1.73M2 — SIGNIFICANT CHANGE UP
GLUCOSE SERPL-MCNC: 94 MG/DL — SIGNIFICANT CHANGE UP (ref 70–99)
HCT VFR BLD CALC: 28.3 % — LOW (ref 39–50)
HGB BLD-MCNC: 9.1 G/DL — LOW (ref 13–17)
MAGNESIUM SERPL-MCNC: 2.5 MG/DL — SIGNIFICANT CHANGE UP (ref 1.6–2.6)
MCHC RBC-ENTMCNC: 30.8 PG — SIGNIFICANT CHANGE UP (ref 27–34)
MCHC RBC-ENTMCNC: 32.2 GM/DL — SIGNIFICANT CHANGE UP (ref 32–36)
MCV RBC AUTO: 95.9 FL — SIGNIFICANT CHANGE UP (ref 80–100)
NON-GYNECOLOGICAL CYTOLOGY STUDY: SIGNIFICANT CHANGE UP
PLATELET # BLD AUTO: 209 K/UL — SIGNIFICANT CHANGE UP (ref 150–400)
POTASSIUM SERPL-MCNC: 4.6 MMOL/L — SIGNIFICANT CHANGE UP (ref 3.5–5.3)
POTASSIUM SERPL-SCNC: 4.6 MMOL/L — SIGNIFICANT CHANGE UP (ref 3.5–5.3)
RBC # BLD: 2.95 M/UL — LOW (ref 4.2–5.8)
RBC # FLD: 15.8 % — HIGH (ref 10.3–14.5)
SODIUM SERPL-SCNC: 136 MMOL/L — SIGNIFICANT CHANGE UP (ref 135–145)
WBC # BLD: 6.57 K/UL — SIGNIFICANT CHANGE UP (ref 3.8–10.5)
WBC # FLD AUTO: 6.57 K/UL — SIGNIFICANT CHANGE UP (ref 3.8–10.5)

## 2023-01-04 PROCEDURE — 99231 SBSQ HOSP IP/OBS SF/LOW 25: CPT

## 2023-01-04 PROCEDURE — 71045 X-RAY EXAM CHEST 1 VIEW: CPT | Mod: 26

## 2023-01-04 RX ADMIN — SODIUM CHLORIDE 3 MILLILITER(S): 9 INJECTION INTRAMUSCULAR; INTRAVENOUS; SUBCUTANEOUS at 12:56

## 2023-01-04 RX ADMIN — Medication 1 TABLET(S): at 12:42

## 2023-01-04 RX ADMIN — LIDOCAINE 1 PATCH: 4 CREAM TOPICAL at 02:00

## 2023-01-04 RX ADMIN — SIMVASTATIN 20 MILLIGRAM(S): 20 TABLET, FILM COATED ORAL at 21:33

## 2023-01-04 RX ADMIN — SODIUM CHLORIDE 3 MILLILITER(S): 9 INJECTION INTRAMUSCULAR; INTRAVENOUS; SUBCUTANEOUS at 21:32

## 2023-01-04 RX ADMIN — PANTOPRAZOLE SODIUM 40 MILLIGRAM(S): 20 TABLET, DELAYED RELEASE ORAL at 06:06

## 2023-01-04 RX ADMIN — Medication 325 MILLIGRAM(S): at 12:42

## 2023-01-04 RX ADMIN — Medication 1 MILLIGRAM(S): at 12:43

## 2023-01-04 RX ADMIN — SODIUM CHLORIDE 3 MILLILITER(S): 9 INJECTION INTRAMUSCULAR; INTRAVENOUS; SUBCUTANEOUS at 06:35

## 2023-01-04 NOTE — PROGRESS NOTE ADULT - ASSESSMENT
82M transferred from [email or web address]] with spontaneous hemothorax on eliquis, s/p chest tube, now mildly hypotensive secondary to hypovolemia from acute blood loss and resumption of antihypertensives.

## 2023-01-04 NOTE — PROGRESS NOTE ADULT - PROBLEM SELECTOR PLAN 1
spontaneous hemothorax, non traumatic per patient  s/p Left PTC placement  Maintain to H2O seal  Daily CXR while PTC in place  Record drainage q12 hours  Exudative effusion, culture negative  Follow up Cytology  Appreciate cardiology consult, no ischemic work up at this time  Will discuss timing of restarting Eliquis  Plan to be discussed with Thoracic Surgery team in AM rounds spontaneous hemothorax, non traumatic per patient  s/p Left PTC placement  Maintain to H2O seal  Daily CXR while PTC in place  Record drainage q12 hours  Exudative effusion, culture negative  Follow up Cytology  s/p 1 PRBC 1/3, trend CBC   Appreciate cardiology consult, no ischemic work up at this time  Will discuss timing of restarting Eliquis  Plan to be discussed with Thoracic Surgery team in AM rounds

## 2023-01-04 NOTE — PROGRESS NOTE ADULT - SUBJECTIVE AND OBJECTIVE BOX
Subjective: Patient seen and assessed s/p hemothorax. Patient states "it just hurts by the tube" At time of exam, Pt denies chest pain, palpitations, dizziness, headache, shortness of breath, abdominal pain or N/V/D/C.    Pertinent events of the past 24 hours: Left PTC with ongoing drainage     VITAL SIGNS  Vital Signs Last 24 Hrs  T(C): 36.7 (23 @ 00:20), Max: 37.1 (23 @ 12:20)  T(F): 98 (23 @ 00:20), Max: 98.8 (23 @ 12:20)  HR: 51 (23 @ 00:20) (51 - 61)  BP: 117/62 (23 @ 00:20) (100/54 - 133/69)  RR: 16 (23 @ 00:20) (16 - 18)  SpO2: 97% (23 @ 00:20) (87% - 97%)  on (O2)              Telemetry/Alarms:  Junctional Rhythm 50s    MEDICATIONS  acetaminophen     Tablet .. 650 milliGRAM(s) Oral every 6 hours PRN  ferrous    sulfate 325 milliGRAM(s) Oral daily  folic acid 1 milliGRAM(s) Oral daily  influenza  Vaccine (HIGH DOSE) 0.7 milliLiter(s) IntraMuscular once  lidocaine   4% Patch 1 Patch Transdermal daily  multivitamin 1 Tablet(s) Oral daily  oxyCODONE    IR 5 milliGRAM(s) Oral every 6 hours PRN  pantoprazole    Tablet 40 milliGRAM(s) Oral before breakfast  simvastatin 20 milliGRAM(s) Oral at bedtime  sodium chloride 0.9% lock flush 3 milliLiter(s) IV Push every 8 hours    PHYSICAL EXAM  Constitutional: NAD  Neuro: A+O x 3, non-focal, speech clear and intact  CV: regular rate, regular rhythm, +S1S2, no murmurs or rub  Pulm/chest: lung sounds CTA and equal bilaterally, diminished at right base, no accessory muscle use noted  Abd: +BS, soft, NT, ND  Ext: FIELDS x 4, no C/C/E  Skin: warm, well perfused  Psych: calm, appropriate affect  Drains: Left posterior PTC to H2O seal, draining scant amount of thin serosanguineous fluid, no air leak, no SQ air, site c/d/i    Intake and Output   @ 07:  -   @ 07:00  --------------------------------------------------------  IN: 840 mL / OUT: 870 mL / NET: -30 mL     @ 07:  -   @ 01:32  --------------------------------------------------------  IN: 0 mL / OUT: 400 mL / NET: -400 mL    Weights:  Daily     Daily Weight in k.7 (2023 04:06)  Admit Wt: Drug Dosing Weight  Height (cm): 172.7 (2023 08:30)  Weight (kg): 83.1 (2023 08:30)  BMI (kg/m2): 27.9 (2023 08:30)  BSA (m2): 1.97 (2023 08:30)    All laboratory results, radiology and medications reviewed.    LABS      136  |  102  |  43.9<H>  ----------------------------<  96  4.4   |  27.0  |  1.39<H>    Ca    8.4      2023 05:38  Mg     2.5         TPro  5.8<L>  /  Alb  3.2<L>  /  TBili  0.6  /  DBili  x   /  AST  21  /  ALT  11  /  AlkPhos  82                                   9.0    8.66  )-----------( 220      ( 2023 17:04 )             27.8            Bilirubin Total, Serum: 0.6 mg/dL ( @ 05:38)    < from: CT Chest No Cont (23 @ 10:07) >    IMPRESSION: Small loculated left hydropneumothorax containing pigtail   catheter. The left pleural effusion has markedly decreased in size when   comparedto previous exam.    < end of copied text >

## 2023-01-05 DIAGNOSIS — I47.29 OTHER VENTRICULAR TACHYCARDIA: ICD-10-CM

## 2023-01-05 DIAGNOSIS — I48.91 UNSPECIFIED ATRIAL FIBRILLATION: ICD-10-CM

## 2023-01-05 LAB
ANION GAP SERPL CALC-SCNC: 6 MMOL/L — SIGNIFICANT CHANGE UP (ref 5–17)
BUN SERPL-MCNC: 31 MG/DL — HIGH (ref 8–20)
CALCIUM SERPL-MCNC: 8.3 MG/DL — LOW (ref 8.4–10.5)
CHLORIDE SERPL-SCNC: 103 MMOL/L — SIGNIFICANT CHANGE UP (ref 96–108)
CO2 SERPL-SCNC: 28 MMOL/L — SIGNIFICANT CHANGE UP (ref 22–29)
CREAT SERPL-MCNC: 1 MG/DL — SIGNIFICANT CHANGE UP (ref 0.5–1.3)
EGFR: 75 ML/MIN/1.73M2 — SIGNIFICANT CHANGE UP
GLUCOSE SERPL-MCNC: 91 MG/DL — SIGNIFICANT CHANGE UP (ref 70–99)
HCT VFR BLD CALC: 28.4 % — LOW (ref 39–50)
HGB BLD-MCNC: 9.1 G/DL — LOW (ref 13–17)
MAGNESIUM SERPL-MCNC: 2.5 MG/DL — SIGNIFICANT CHANGE UP (ref 1.6–2.6)
MCHC RBC-ENTMCNC: 31 PG — SIGNIFICANT CHANGE UP (ref 27–34)
MCHC RBC-ENTMCNC: 32 GM/DL — SIGNIFICANT CHANGE UP (ref 32–36)
MCV RBC AUTO: 96.6 FL — SIGNIFICANT CHANGE UP (ref 80–100)
PLATELET # BLD AUTO: 231 K/UL — SIGNIFICANT CHANGE UP (ref 150–400)
POTASSIUM SERPL-MCNC: 4.7 MMOL/L — SIGNIFICANT CHANGE UP (ref 3.5–5.3)
POTASSIUM SERPL-SCNC: 4.7 MMOL/L — SIGNIFICANT CHANGE UP (ref 3.5–5.3)
RBC # BLD: 2.94 M/UL — LOW (ref 4.2–5.8)
RBC # FLD: 15 % — HIGH (ref 10.3–14.5)
SARS-COV-2 RNA SPEC QL NAA+PROBE: SIGNIFICANT CHANGE UP
SODIUM SERPL-SCNC: 136 MMOL/L — SIGNIFICANT CHANGE UP (ref 135–145)
WBC # BLD: 5.44 K/UL — SIGNIFICANT CHANGE UP (ref 3.8–10.5)
WBC # FLD AUTO: 5.44 K/UL — SIGNIFICANT CHANGE UP (ref 3.8–10.5)

## 2023-01-05 PROCEDURE — 71045 X-RAY EXAM CHEST 1 VIEW: CPT | Mod: 26,76

## 2023-01-05 PROCEDURE — 93010 ELECTROCARDIOGRAM REPORT: CPT

## 2023-01-05 PROCEDURE — 99233 SBSQ HOSP IP/OBS HIGH 50: CPT

## 2023-01-05 PROCEDURE — 99231 SBSQ HOSP IP/OBS SF/LOW 25: CPT

## 2023-01-05 RX ORDER — APIXABAN 2.5 MG/1
5 TABLET, FILM COATED ORAL
Refills: 0 | Status: DISCONTINUED | OUTPATIENT
Start: 2023-01-05 | End: 2023-01-05

## 2023-01-05 RX ADMIN — Medication 1 TABLET(S): at 12:07

## 2023-01-05 RX ADMIN — Medication 1 MILLIGRAM(S): at 12:06

## 2023-01-05 RX ADMIN — SODIUM CHLORIDE 3 MILLILITER(S): 9 INJECTION INTRAMUSCULAR; INTRAVENOUS; SUBCUTANEOUS at 13:03

## 2023-01-05 RX ADMIN — PANTOPRAZOLE SODIUM 40 MILLIGRAM(S): 20 TABLET, DELAYED RELEASE ORAL at 05:35

## 2023-01-05 RX ADMIN — SODIUM CHLORIDE 3 MILLILITER(S): 9 INJECTION INTRAMUSCULAR; INTRAVENOUS; SUBCUTANEOUS at 05:28

## 2023-01-05 RX ADMIN — SIMVASTATIN 20 MILLIGRAM(S): 20 TABLET, FILM COATED ORAL at 21:35

## 2023-01-05 RX ADMIN — SODIUM CHLORIDE 3 MILLILITER(S): 9 INJECTION INTRAMUSCULAR; INTRAVENOUS; SUBCUTANEOUS at 21:35

## 2023-01-05 RX ADMIN — Medication 325 MILLIGRAM(S): at 12:06

## 2023-01-05 NOTE — PROGRESS NOTE ADULT - SUBJECTIVE AND OBJECTIVE BOX
[facility]                                                         CARDIOLOGY AT [facility]                                                                  [address], [place] [address]                                                         Telephone: [phone]. Fax:[phone]                                                                             PROGRESS NOTE    Reason for follow up: NSVT  Update: Called back to re-evaluate the patient for NSVT. Patient has no chest pain or dyspnea at this time. Still with chest tube in place. Patient states his last ischemic workup was a few years ago.       Review of symptoms:   Cardiac:  No chest pain. No dyspnea. No palpitations.  Respiratory: no cough. No dyspnea  Gastrointestinal: No diarrhea. No abdominal pain. No bleeding.   Neuro: No focal neuro complaints.    Vitals:  T(C): 36.7 (01-05-23 @ 08:20), Max: 36.7 (01-04-23 @ 20:00)  HR: 55 (01-05-23 @ 08:20) (50 - 56)  BP: 121/60 (01-05-23 @ 08:20) (115/64 - 121/60)  RR: 18 (01-05-23 @ 08:20) (18 - 18)  SpO2: 95% (01-05-23 @ 08:20) (94% - 100%)  Wt(kg): --  I&O's Summary    04 Jan 2023 07:01  -  05 Jan 2023 07:00  --------------------------------------------------------  IN: 500 mL / OUT: 470 mL / NET: 30 mL      Weight (kg): 83.1 (01-01 @ 08:30)    PHYSICAL EXAM:  Appearance: Comfortable. No acute distress  HEENT:  Atraumatic. Normocephalic.  Normal oral mucosa  Neurologic: A & O x 3, no gross focal deficits.  Cardiovascular: RRR S1 S2,  no rubs/gallops. No JVD,   Respiratory: Decreased BS on left, + left PTC  Gastrointestinal:  Soft, Non-tender, + BS  Lower Extremities: 2+ Peripheral Pulses, No clubbing, cyanosis, or edema  Psychiatry: Patient is calm. No agitation.   Skin: warm and dry.    CURRENT CARDIAC MEDICATIONS:      CURRENT OTHER MEDICATIONS:  acetaminophen     Tablet .. 650 milliGRAM(s) Oral every 6 hours PRN Temp greater or equal to 38.5C (101.3F), Mild Pain (1 - 3)  oxyCODONE    IR 5 milliGRAM(s) Oral every 6 hours PRN Severe Pain (7 - 10)  pantoprazole    Tablet 40 milliGRAM(s) Oral before breakfast  simvastatin 20 milliGRAM(s) Oral at bedtime  ferrous    sulfate 325 milliGRAM(s) Oral daily  folic acid 1 milliGRAM(s) Oral daily  influenza  Vaccine (HIGH DOSE) 0.7 milliLiter(s) IntraMuscular once  lidocaine   4% Patch 1 Patch Transdermal daily  multivitamin 1 Tablet(s) Oral daily  sodium chloride 0.9% lock flush 3 milliLiter(s) IV Push every 8 hours      LABS:	 	  ( 01 Jan 2023 03:48 )  Troponin T  0.01 ,  CPK  X    , CKMB  X    , BNP X                                  9.1    5.44  )-----------( 231      ( 05 Jan 2023 06:00 )             28.4     01-05    136  |  103  |  31.0<H>  ----------------------------<  91  4.7   |  28.0  |  1.00    Ca    8.3<L>      05 Jan 2023 06:00  Mg     2.5     01-05      PT/INR/PTT ( 01 Jan 2023 03:48 )                       :                       :      14.4         :       32.6                  .        .                   .              .           .       1.24        .                                       Lipid Profile:   HgA1c:   TSH: Thyroid Stimulating Hormone, Serum: 2.59 uIU/mL      TELEMETRY:   ECG:    DIAGNOSTIC TESTING:  [ ] Echocardiogram:   < from: TTE Echo Complete w/ Contrast w/ Doppler (01.02.23 @ 16:46) >  PHYSICIAN INTERPRETATION:  Left Ventricle: Endocardial visualization was enhanced with intravenous   echo contrast. The left ventricular internal cavity size is normal.  Global LV systolic function was normal. Left ventricular ejection  fraction, by visual estimation, is 60 to 65%. Abnormal (paradoxical)   septal motion consistent with post-operative status. The left ventricular   diastolic function could not be assessed in this study.  Right Ventricle: The right ventricular size is mildly enlarged. RV   systolic function is mildly reduced.  Left Atrium: Severely enlarged left atrium.  Right Atrium: Mildly enlarged right atrium.  Pericardium: There is no evidence of pericardial effusion.  Mitral Valve: Status-post mitral annular ring insertion. Mild mitral   valve regurgitation is seen. MVA BY continuity equation=1.6cm2. PHT   120msec, MG 3.06mmHg. Findings are suggestive of moderate mitral stenosis.  Tricuspid Valve: The tricuspid valve is normal in structure.   Mild-moderate tricuspid regurgitation is visualized. Estimated pulmonary   artery systolic pressure is 50.0 mmHg assuming a right atrial pressure of   15 mmHg, which is consistent with moderate pulmonary hypertension.  Aortic Valve: The aortic valve is trileaflet. Sclerotic aortic valve with   normal opening. Mild aortic valve regurgitation is seen.  Pulmonic Valve: The pulmonic valve was not well visualized. Mild pulmonic   valve regurgitation.  Aorta: There is dilatation of the aortic root.  Pulmonary Artery: The pulmonary artery is of normal size and origin.  Venous: The inferior vena cava was dilated, with respiratory size   variation less than 50%.  In comparison to the previous echocardiogram(s): There are no prior   studies on this patient for comparison purposes.      Summary:   1. Technically difficult study.   2. Left ventricular ejection fraction, by visual estimation, is 60 to   65%.   3. Endocardial visualization was enhanced with intravenous echo contrast.   4. Normal global left ventricular systolic function.   5. There is moderate concentric left ventricular hypertrophy.   6. The left ventricular diastolic function could not be assessed in this   study.   7. Mildly reduced RV systolic function.   8. Mildly enlarged right ventricle.   9. Mildly enlarged right atrium.  10. Severely enlarged left atrium.  11. Mild mitral valve regurgitation.  12. MVA BY continuity equation=1.6cm2. PHT 120msec, MG 3.06mmHg. Findings   are suggestive of moderate mitral stenosis.  13. Status-post mitral annular ring insertion.  14. Mild-moderate tricuspid regurgitation.  15. Mild aortic regurgitation.  16. Sclerotic aortic valve with normal opening.  17. Dilatation of the aortic root. 3.9cm  18. Estimated pulmonary artery systolic pressure is 50.0 mmHg assuming a   right atrial pressure of 15 mmHg, which is consistent with moderate   pulmonary hypertension.  19. Mild pulmonic valve regurgitation.    Abiodun James MD Electronically signed on 1/3/2023 at 8:37:44 AM    < end of copied text >    [ ]  Catheterization:  [ ] Stress Test:    OTHER: 	                                                                [facility]                                                         CARDIOLOGY AT [facility]                                                                  [address], [place] [address]                                                         Telephone: [phone]. Fax:[phone]                                                                             PROGRESS NOTE    Reason for follow up: NSVT  Update: Called back to re-evaluate the patient for NSVT. Patient has no chest pain or dyspnea at this time. Still with chest tube in place. Patient states his last ischemic workup was a few years ago.       Review of symptoms:   Cardiac:  No chest pain. No dyspnea. No palpitations.  Respiratory: no cough. No dyspnea  Gastrointestinal: No diarrhea. No abdominal pain. No bleeding.   Neuro: No focal neuro complaints.    Vitals:  T(C): 36.7 (01-05-23 @ 08:20), Max: 36.7 (01-04-23 @ 20:00)  HR: 55 (01-05-23 @ 08:20) (50 - 56)  BP: 121/60 (01-05-23 @ 08:20) (115/64 - 121/60)  RR: 18 (01-05-23 @ 08:20) (18 - 18)  SpO2: 95% (01-05-23 @ 08:20) (94% - 100%)  Wt(kg): --  I&O's Summary    04 Jan 2023 07:01  -  05 Jan 2023 07:00  --------------------------------------------------------  IN: 500 mL / OUT: 470 mL / NET: 30 mL      Weight (kg): 83.1 (01-01 @ 08:30)    PHYSICAL EXAM:  Appearance: Comfortable. No acute distress  HEENT:  Atraumatic. Normocephalic.  Normal oral mucosa  Neurologic: A & O x 3, no gross focal deficits.  Cardiovascular: RRR S1 S2,  no rubs/gallops. No JVD,   Respiratory: Decreased BS on left, + left PTC  Gastrointestinal:  Soft, Non-tender, + BS  Lower Extremities: 2+ Peripheral Pulses, No clubbing, cyanosis, or edema  Psychiatry: Patient is calm. No agitation.   Skin: warm and dry.    CURRENT CARDIAC MEDICATIONS:      CURRENT OTHER MEDICATIONS:  acetaminophen     Tablet .. 650 milliGRAM(s) Oral every 6 hours PRN Temp greater or equal to 38.5C (101.3F), Mild Pain (1 - 3)  oxyCODONE    IR 5 milliGRAM(s) Oral every 6 hours PRN Severe Pain (7 - 10)  pantoprazole    Tablet 40 milliGRAM(s) Oral before breakfast  simvastatin 20 milliGRAM(s) Oral at bedtime  ferrous    sulfate 325 milliGRAM(s) Oral daily  folic acid 1 milliGRAM(s) Oral daily  influenza  Vaccine (HIGH DOSE) 0.7 milliLiter(s) IntraMuscular once  lidocaine   4% Patch 1 Patch Transdermal daily  multivitamin 1 Tablet(s) Oral daily  sodium chloride 0.9% lock flush 3 milliLiter(s) IV Push every 8 hours      LABS:	 	  ( 01 Jan 2023 03:48 )  Troponin T  0.01 ,  CPK  X    , CKMB  X    , BNP X                                  9.1    5.44  )-----------( 231      ( 05 Jan 2023 06:00 )             28.4     01-05    136  |  103  |  31.0<H>  ----------------------------<  91  4.7   |  28.0  |  1.00    Ca    8.3<L>      05 Jan 2023 06:00  Mg     2.5     01-05      PT/INR/PTT ( 01 Jan 2023 03:48 )                       :                       :      14.4         :       32.6                  .        .                   .              .           .       1.24        .                                       Lipid Profile:   HgA1c:   TSH: Thyroid Stimulating Hormone, Serum: 2.59 uIU/mL      TELEMETRY: Afib with slow ventricular response  ECG: Afib with slow ventricular response, RBBB    DIAGNOSTIC TESTING:  [ ] Echocardiogram:   < from: TTE Echo Complete w/ Contrast w/ Doppler (01.02.23 @ 16:46) >  PHYSICIAN INTERPRETATION:  Left Ventricle: Endocardial visualization was enhanced with intravenous   echo contrast. The left ventricular internal cavity size is normal.  Global LV systolic function was normal. Left ventricular ejection  fraction, by visual estimation, is 60 to 65%. Abnormal (paradoxical)   septal motion consistent with post-operative status. The left ventricular   diastolic function could not be assessed in this study.  Right Ventricle: The right ventricular size is mildly enlarged. RV   systolic function is mildly reduced.  Left Atrium: Severely enlarged left atrium.  Right Atrium: Mildly enlarged right atrium.  Pericardium: There is no evidence of pericardial effusion.  Mitral Valve: Status-post mitral annular ring insertion. Mild mitral   valve regurgitation is seen. MVA BY continuity equation=1.6cm2. PHT   120msec, MG 3.06mmHg. Findings are suggestive of moderate mitral stenosis.  Tricuspid Valve: The tricuspid valve is normal in structure.   Mild-moderate tricuspid regurgitation is visualized. Estimated pulmonary   artery systolic pressure is 50.0 mmHg assuming a right atrial pressure of   15 mmHg, which is consistent with moderate pulmonary hypertension.  Aortic Valve: The aortic valve is trileaflet. Sclerotic aortic valve with   normal opening. Mild aortic valve regurgitation is seen.  Pulmonic Valve: The pulmonic valve was not well visualized. Mild pulmonic   valve regurgitation.  Aorta: There is dilatation of the aortic root.  Pulmonary Artery: The pulmonary artery is of normal size and origin.  Venous: The inferior vena cava was dilated, with respiratory size   variation less than 50%.  In comparison to the previous echocardiogram(s): There are no prior   studies on this patient for comparison purposes.      Summary:   1. Technically difficult study.   2. Left ventricular ejection fraction, by visual estimation, is 60 to   65%.   3. Endocardial visualization was enhanced with intravenous echo contrast.   4. Normal global left ventricular systolic function.   5. There is moderate concentric left ventricular hypertrophy.   6. The left ventricular diastolic function could not be assessed in this   study.   7. Mildly reduced RV systolic function.   8. Mildly enlarged right ventricle.   9. Mildly enlarged right atrium.  10. Severely enlarged left atrium.  11. Mild mitral valve regurgitation.  12. MVA BY continuity equation=1.6cm2. PHT 120msec, MG 3.06mmHg. Findings   are suggestive of moderate mitral stenosis.  13. Status-post mitral annular ring insertion.  14. Mild-moderate tricuspid regurgitation.  15. Mild aortic regurgitation.  16. Sclerotic aortic valve with normal opening.  17. Dilatation of the aortic root. 3.9cm  18. Estimated pulmonary artery systolic pressure is 50.0 mmHg assuming a   right atrial pressure of 15 mmHg, which is consistent with moderate   pulmonary hypertension.  19. Mild pulmonic valve regurgitation.    Abiodun James MD Electronically signed on 1/3/2023 at 8:37:44 AM    < end of copied text >    [ ]  Catheterization:  [ ] Stress Test:    OTHER:

## 2023-01-05 NOTE — PROGRESS NOTE ADULT - ASSESSMENT
82M transferred from [facility] with spontaneous hemothorax on eliquis, s/p chest tube, now mildly hypotensive secondary to hypovolemia from acute blood loss and resumption of antihypertensives.

## 2023-01-05 NOTE — PROGRESS NOTE ADULT - PROBLEM SELECTOR PLAN 2
- SR/SB on monitor   - no BB 2/2 bradycardia   - Previously on Eliquis, now with spontaneous hemothorax   - CHADSVASC (age, HTN) 3.   - would resume when cleared by thoracic surgery. - Atrial fibrillation with slow ventricular response. PVCs.   - EP consult.   - AV nodals on hold.   - no BB 2/2 bradycardia   - Previously on Eliquis, now with spontaneous hemothorax   - CHADSVASC (age, HTN) 3.   - would resume when cleared by thoracic surgery.

## 2023-01-05 NOTE — PROGRESS NOTE ADULT - PROBLEM SELECTOR PLAN 1
- Patient with elevated troponin while at [place].   - Troponin here are negative.   - Patient's echocardiogram with normal EF, no RWMA, mildly reduced RV, moderate MS, mild to mod TR, moderate pulmonary HTN.   - Patient with 11 beats of NSVT overnight 01/04/22.   - Unable to start AV nodals due to bradycardia.   - Repeat EKG pending.   - No recent ischemic evaluation.   - Will discuss inpatient vs outpatient NST with Dr. Blum.  - Continue statin. - Patient with elevated troponin while at [place].   - Troponin here are negative.   - Patient's echocardiogram with normal EF, no RWMA, mildly reduced RV, moderate MS, mild to mod TR, moderate pulmonary HTN.   - Patient with 11 beats of NSVT overnight 01/04/22.   - Unable to start AV nodals due to bradycardia. EP consult.   - Due to recent hemothorax, defer ischmic evaluation to an outpatient.   - Continue statin.

## 2023-01-05 NOTE — PROGRESS NOTE ADULT - PROBLEM SELECTOR PLAN 1
spontaneous hemothorax, non traumatic per patient  s/p Left PTC placement  Maintain to H2O seal  Daily CXR while PTC in place  Record drainage q12 hours  Exudative effusion, culture negative  Cytology negative for malignant cells   s/p 1 PRBC 1/3, trend CBC   Appreciate cardiology consult, no ischemic work up at this time  Plan to remove CT once drainage allows   Will discuss timing of restarting Eliquis  Plan to be discussed with Thoracic Surgery team in AM rounds

## 2023-01-05 NOTE — PROGRESS NOTE ADULT - PROBLEM SELECTOR PLAN 3
- Patient with 11 beats of NSVT.   - Repeat EKG pending.   - Keep K>4, Mg>2.   - Ischemic evaluation to be discussed with Dr. Blum. - Patient with 11 beats of NSVT.   - Repeat EKG pending.   - Keep K>4, Mg>2.   - Likely outpatient ischemic evaluation.

## 2023-01-05 NOTE — PROGRESS NOTE ADULT - NS ATTEND AMEND GEN_ALL_CORE FT
ECg reviewed: non-sustained ventricular tachycardia /  slow atrial fibrillation with RBBB  EP consulted for PPM evaln

## 2023-01-05 NOTE — PROGRESS NOTE ADULT - ASSESSMENT
82M presented to [facility] via Ambulance on 12/29 with acute onset shortness of breath and left sided chest pain radiating down left arm.  He was hypoxic on admission requiring 3L NC, and relatively hypotensive with SBP in the 90's according to [facility] chart.  Cardiac workup revealed mildly elevated troponin likely secondary to demand ischemia in the setting of what was presumed pneumonia, with no abnormality on EKG except for RBBB and rate controlled afib.  CTangio chest r/o PE but revealed left sided effusion for which a thoracentesis was performed, yielding 300cc of jd blood.  Thoracic surgery was consulted and the patient was transferred to [facility] for management of spontaneous hemothorax.  He is on eliquis for chronic persistent atrial fibrillation.  He denies any sort of trauma to account for an etiology of this hemothorax (he was sitting down drinking coffee when initial symptoms began).     Appreciate above HPI. 82 y/op M with PMH HTN, heart failure unspecified, HLD, mitral ring, Afib on EliquisPt sitting up in bed, awaiting chest tube by CTsx. States history of MVR, possible clip to LA. States no history of heart attack, stent, PCI in past.

## 2023-01-05 NOTE — PROGRESS NOTE ADULT - SUBJECTIVE AND OBJECTIVE BOX
[place] CARDIAC ELECTROPHYSIOLOGY  [facility] Faculty Practice   Office: [address] [place] [address]  Telephone: [phone] Fax:[phone]      HPI:  82M with PMH of long standing persistent AF (rate controlled) on Eliquis, HTN, HLD, MR s/p Mitral ring and TOOTIE clip ([facility]; Dr. Ayoub), who was transferred from [facility] on 12/29 for thoracic surgery eval after found to have unexplained spontaneous hemothorax.  Status post chest tube with significant bloody fluid- now removed. Eliquis being held. Reportedly had mildly elevated Troponin while at [place], which was thought to be secondary to demand ischemia. Pt seen by general cardiology team and ischemic workup deferred to outpatient once hemothorax resolve. Telemetry monitoring revealed AF w/ slow VR and NSVT for which EP was called for consultation.    Pt seen and examined, reports feeing well, denies any complaints EKGs reviewed and revealed AF w/ slow VR (possibly intermittent HB), RBBB and borderline LPFB. Telemetry revealed AF w/ slow VR, PVCs, and an episodes on NSVT (11 beats). Pt denies any recent or prior dizziness, palpitations, presyncope or syncope. Denies CP or SOB. Denies fevers, chills, N/V, hematuria, bloody or dark stools.     Son (Jay Jay Gary) [phone]    EP: Dr. Ramos ([place])  Cardiologist: Dr. Cox ([place])    PAST MEDICAL & SURGICAL HISTORY:  Atrial fibrillation   HTN (hypertension)  HLD (hyperlipidemia)  H/O mitral valve repair  H/O hernia repair        REVIEW OF SYSTEMS:    CONSTITUTIONAL: No fever, weight loss, or fatigue  NECK: No pain or stiffness  RESPIRATORY: No cough, wheezing, or chills. No shortness of breath  CARDIOVASCULAR: see HPI  GASTROINTESTINAL: No abdominal or epigastric pain. No nausea, vomiting, or hematemesis; No diarrhea or constipation. No melena or hematochezia.  NEUROLOGICAL: No headaches, memory loss, loss of strength, numbness, or tremors  SKIN: + itching + rashes (Eczema)    LYMPH NODES: No enlarged glands  PSYCHIATRIC: No depression, anxiety, mood swings, or difficulty sleeping  HEME/LYMPH: No easy bruising, or bleeding gums      MEDICATIONS  (STANDING):  ferrous    sulfate 325 milliGRAM(s) Oral daily  folic acid 1 milliGRAM(s) Oral daily  influenza  Vaccine (HIGH DOSE) 0.7 milliLiter(s) IntraMuscular once  lidocaine   4% Patch 1 Patch Transdermal daily  multivitamin 1 Tablet(s) Oral daily  pantoprazole    Tablet 40 milliGRAM(s) Oral before breakfast  simvastatin 20 milliGRAM(s) Oral at bedtime  sodium chloride 0.9% lock flush 3 milliLiter(s) IV Push every 8 hours    MEDICATIONS  (PRN):  acetaminophen     Tablet .. 650 milliGRAM(s) Oral every 6 hours PRN Temp greater or equal to 38.5C (101.3F), Mild Pain (1 - 3)  oxyCODONE    IR 5 milliGRAM(s) Oral every 6 hours PRN Severe Pain (7 - 10)      Allergies  spironolactone (Rash)      SOCIAL HISTORY:  Former smoker (Quit 60 years ago)  Occasional ETOH  Denies Drug use  Live with wife (Wife currently in [facility] for pelvic fracture)      Vital Signs Last 24 Hrs  T(C): 36.8 (05 Jan 2023 12:05), Max: 36.8 (05 Jan 2023 12:05)  T(F): 98.2 (05 Jan 2023 12:05), Max: 98.2 (05 Jan 2023 12:05)  HR: 68 (05 Jan 2023 12:05) (50 - 68)  BP: 140/66 (05 Jan 2023 12:05) (115/64 - 140/66)  RR: 18 (05 Jan 2023 12:05) (18 - 18)  SpO2: 95% (05 Jan 2023 12:05) (94% - 100%)    Parameters below as of 05 Jan 2023 12:05  Patient On (Oxygen Delivery Method): room air        Physical Exam:  Constitutional: AAOx3, NAD  Neck: supple, No JVD  Cardiovascular: +S1S2 slightly irregular   Pulmonary: Diminished BS lower lower. Dressing noted over prior left sided CT.   Abdomen: +BS, soft NTND  Extremities: Trace edema   Neuro: non focal, speech clear, FIELDS x 4    LABS:                        9.1    5.44  )-----------( 231      ( 05 Jan 2023 06:00 )             28.4   01-05    136  |  103  |  31.0<H>  ----------------------------<  91  4.7   |  28.0  |  1.00    Ca    8.3<L>      05 Jan 2023 06:00  Mg     2.5     01-05              RADIOLOGY & ADDITIONAL STUDIES:  TTE 1/2/23  Summary:   1. Technically difficult study.   2. Left ventricular ejection fraction, by visual estimation, is 60 to   65%.   3. Endocardial visualization was enhanced with intravenous echo contrast.   4. Normal global left ventricular systolic function.   5. There is moderate concentric left ventricular hypertrophy.   6. The left ventricular diastolic function could not be assessed in this   study.   7. Mildly reduced RV systolic function.   8. Mildly enlarged right ventricle.   9. Mildly enlarged right atrium.  10. Severely enlarged left atrium.  11. Mild mitral valve regurgitation.  12. MVA BY continuity equation=1.6cm2. PHT 120msec, MG 3.06mmHg. Findings   are suggestive of moderate mitral stenosis.  13. Status-post mitral annular ring insertion.  14. Mild-moderate tricuspid regurgitation.  15. Mild aortic regurgitation.  16. Sclerotic aortic valve with normal opening.  17. Dilatation of the aortic root. 3.9cm  18. Estimated pulmonary artery systolic pressure is 50.0 mmHg assuming a   right atrial pressure of 15 mmHg, which is consistent with moderate   pulmonary hypertension.  19. Mild pulmonic valve regurgitation.     [place] CARDIAC ELECTROPHYSIOLOGY  [facility] Faculty Practice   Office: [address] [place] [address]  Telephone: [phone] Fax:[phone]      HPI:  82M with PMH of long standing persistent AF (rate controlled) on Eliquis, HTN, HLD, MR s/p Mitral ring and TOOTIE clip ([facility]; Dr. Ayoub), who was transferred from [facility] on 12/29 for thoracic surgery eval after found to have unexplained spontaneous hemothorax.  Status post chest tube with significant bloody fluid- now removed. Eliquis being held. Reportedly had mildly elevated Troponin while at [place], which was thought to be secondary to demand ischemia. Pt seen by general cardiology team and ischemic workup deferred to outpatient once hemothorax resolve. Telemetry monitoring revealed AF w/ slow VR and NSVT for which EP was called for consultation.    Pt seen and examined, reports feeing well, denies any complaints EKGs reviewed and revealed AF w/ slow VR (possibly intermittent HB), RBBB and borderline LPFB. Telemetry revealed AF w/ slow VR, PVCs, and an episodes on NSVT (11 beats). Pt not on any AV little blocking agents. Pt denies any recent or prior dizziness, palpitations, presyncope or syncope. Denies CP or SOB. Denies fevers, chills, N/V, hematuria, bloody or dark stools.     Son (Jay Jay Gary) [phone]    EP: Dr. Ramos ([place])  Cardiologist: Dr. Cox ([place])    PAST MEDICAL & SURGICAL HISTORY:  Atrial fibrillation   HTN (hypertension)  HLD (hyperlipidemia)  H/O mitral valve repair  H/O hernia repair        REVIEW OF SYSTEMS:    CONSTITUTIONAL: No fever, weight loss, or fatigue  NECK: No pain or stiffness  RESPIRATORY: No cough, wheezing, or chills. No shortness of breath  CARDIOVASCULAR: see HPI  GASTROINTESTINAL: No abdominal or epigastric pain. No nausea, vomiting, or hematemesis; No diarrhea or constipation. No melena or hematochezia.  NEUROLOGICAL: No headaches, memory loss, loss of strength, numbness, or tremors  SKIN: + itching + rashes (Eczema)    LYMPH NODES: No enlarged glands  PSYCHIATRIC: No depression, anxiety, mood swings, or difficulty sleeping  HEME/LYMPH: No easy bruising, or bleeding gums      MEDICATIONS  (STANDING):  ferrous    sulfate 325 milliGRAM(s) Oral daily  folic acid 1 milliGRAM(s) Oral daily  influenza  Vaccine (HIGH DOSE) 0.7 milliLiter(s) IntraMuscular once  lidocaine   4% Patch 1 Patch Transdermal daily  multivitamin 1 Tablet(s) Oral daily  pantoprazole    Tablet 40 milliGRAM(s) Oral before breakfast  simvastatin 20 milliGRAM(s) Oral at bedtime  sodium chloride 0.9% lock flush 3 milliLiter(s) IV Push every 8 hours    MEDICATIONS  (PRN):  acetaminophen     Tablet .. 650 milliGRAM(s) Oral every 6 hours PRN Temp greater or equal to 38.5C (101.3F), Mild Pain (1 - 3)  oxyCODONE    IR 5 milliGRAM(s) Oral every 6 hours PRN Severe Pain (7 - 10)      Allergies  spironolactone (Rash)      SOCIAL HISTORY:  Former smoker (Quit 60 years ago)  Occasional ETOH  Denies Drug use  Live with wife (Wife currently in [facility] for pelvic fracture)      Vital Signs Last 24 Hrs  T(C): 36.8 (05 Jan 2023 12:05), Max: 36.8 (05 Jan 2023 12:05)  T(F): 98.2 (05 Jan 2023 12:05), Max: 98.2 (05 Jan 2023 12:05)  HR: 68 (05 Jan 2023 12:05) (50 - 68)  BP: 140/66 (05 Jan 2023 12:05) (115/64 - 140/66)  RR: 18 (05 Jan 2023 12:05) (18 - 18)  SpO2: 95% (05 Jan 2023 12:05) (94% - 100%)    Parameters below as of 05 Jan 2023 12:05  Patient On (Oxygen Delivery Method): room air        Physical Exam:  Constitutional: AAOx3, NAD  Neck: supple, No JVD  Cardiovascular: +S1S2 slightly irregular   Pulmonary: Diminished BS lower lower. Dressing noted over prior left sided CT.   Abdomen: +BS, soft NTND  Extremities: Trace edema   Neuro: non focal, speech clear, FIELDS x 4    LABS:                        9.1    5.44  )-----------( 231      ( 05 Jan 2023 06:00 )             28.4   01-05    136  |  103  |  31.0<H>  ----------------------------<  91  4.7   |  28.0  |  1.00    Ca    8.3<L>      05 Jan 2023 06:00  Mg     2.5     01-05              RADIOLOGY & ADDITIONAL STUDIES:  TTE 1/2/23  Summary:   1. Technically difficult study.   2. Left ventricular ejection fraction, by visual estimation, is 60 to   65%.   3. Endocardial visualization was enhanced with intravenous echo contrast.   4. Normal global left ventricular systolic function.   5. There is moderate concentric left ventricular hypertrophy.   6. The left ventricular diastolic function could not be assessed in this   study.   7. Mildly reduced RV systolic function.   8. Mildly enlarged right ventricle.   9. Mildly enlarged right atrium.  10. Severely enlarged left atrium.  11. Mild mitral valve regurgitation.  12. MVA BY continuity equation=1.6cm2. PHT 120msec, MG 3.06mmHg. Findings   are suggestive of moderate mitral stenosis.  13. Status-post mitral annular ring insertion.  14. Mild-moderate tricuspid regurgitation.  15. Mild aortic regurgitation.  16. Sclerotic aortic valve with normal opening.  17. Dilatation of the aortic root. 3.9cm  18. Estimated pulmonary artery systolic pressure is 50.0 mmHg assuming a   right atrial pressure of 15 mmHg, which is consistent with moderate   pulmonary hypertension.  19. Mild pulmonic valve regurgitation.

## 2023-01-05 NOTE — PROGRESS NOTE ADULT - ASSESSMENT
82M with PMH of long standing persistent AF (rate controlled) on Eliquis, HTN, HLD, MR s/p Mitral ring and TOOTIE clip ([facility]; Dr. Ayoub), who was transferred from [facility] on 12/29 for thoracic surgery eval after found to have unexplained spontaneous hemothorax.  Status post chest tube with significant bloody fluid- now removed. Eliquis being held. Reportedly had mildly elevated Troponin while at [place], which was thought to be secondary to demand ischemia. Pt seen by general cardiology team and ischemic workup deferred to outpatient once hemothorax resolve. Telemetry monitoring revealed AF w/ slow VR, intermittent HB and NSVT.      Recommendations:    1. Atrial fibrillation w/ RBBB, slow VR and intermittent heart block  - Pt would benefit from pacing support. Patient is agreeable and has been off Eliquis secondary to hemothorax. Will plan for leadless pacemaker (Micra) implant tomorrow morning with Dr. Pal.  - Please keep NPO p MD  - Preop labs per protocol  - Covid pcr needs to be repeated  - Continue to hold Eliquis for now  - Pt with TOOTIE clip; consider MERI to evaluate TOOTIE clip for residual TOOTIE/stump. If TOOTIE cleared can come off long term anticoagulation.     2. PVCs/NSVT  - Maintain Mg >2 K>4  - Ischemic eval per general cardiology team  - Agree with starting BB therapy following PPM implant     - Pt seen and evaluated w/ Dr. Muse; agrees with above    82M with PMH of long standing persistent AF (rate controlled) on Eliquis, HTN, HLD, MR s/p Mitral ring and TOOTIE clip ([facility]; Dr. Ayoub), who was transferred from [facility] on 12/29 for thoracic surgery eval after found to have unexplained spontaneous hemothorax.  Status post chest tube with significant bloody fluid- now removed. Eliquis being held. Reportedly had mildly elevated Troponin while at [place], which was thought to be secondary to demand ischemia. Pt seen by general cardiology team and ischemic workup deferred to outpatient once hemothorax resolve. Telemetry monitoring revealed AF w/ slow VR, intermittent HB and NSVT.      Recommendations:    1. Atrial fibrillation w/ RBBB, slow VR and intermittent heart block  - Pt would benefit from pacing support. Patient is agreeable and has been off Eliquis secondary to hemothorax. Will plan for leadless pacemaker (Micra) implant tomorrow morning with Dr. Pal.  - Please keep NPO p MD  - Preop labs per protocol  - Covid pcr needs to be repeated  - Continue to hold Eliquis for now  - Pt with TOOTIE clip; consider MERI to evaluate TOOTIE clip for residual TOOTIE/stump. If TOOTIE cleared can come off long term anticoagulation.     2. PVCs/NSVT  - Maintain Mg >2 K>4  - Ischemic eval per general cardiology team  - Agree with starting BB therapy following PPM implant     - Pt seen and evaluated w/ Dr. Muse; agrees with above   - D/w CT surgery and cardiology

## 2023-01-05 NOTE — PROGRESS NOTE ADULT - SUBJECTIVE AND OBJECTIVE BOX
Subjective: Patient seen and assessed with spontaneous hemothorax. Patient states "I'm doing okay" At time of exam, Pt denies chest pain, palpitations, dizziness, headache, shortness of breath, abdominal pain or N/V/D/C.    Pertinent events of the past 24 hours: Left PTC continues to drain     VITAL SIGNS  Vital Signs Last 24 Hrs  T(C): 36.7 (23 @ 00:00), Max: 36.7 (23 @ 08:00)  T(F): 98 (23 @ 00:00), Max: 98 (23 @ 08:00)  HR: 50 (23 @ 00:00) (50 - 59)  BP: 115/64 (23 @ 00:00) (110/62 - 122/64)  RR: 18 (23 @ 00:00) (17 - 18)  SpO2: 94% (23 @ 00:00) (94% - 98%)  on (O2)              Telemetry/Alarms:  Junctional 50s    MEDICATIONS  acetaminophen     Tablet .. 650 milliGRAM(s) Oral every 6 hours PRN  ferrous    sulfate 325 milliGRAM(s) Oral daily  folic acid 1 milliGRAM(s) Oral daily  influenza  Vaccine (HIGH DOSE) 0.7 milliLiter(s) IntraMuscular once  lidocaine   4% Patch 1 Patch Transdermal daily  multivitamin 1 Tablet(s) Oral daily  oxyCODONE    IR 5 milliGRAM(s) Oral every 6 hours PRN  pantoprazole    Tablet 40 milliGRAM(s) Oral before breakfast  simvastatin 20 milliGRAM(s) Oral at bedtime  sodium chloride 0.9% lock flush 3 milliLiter(s) IV Push every 8 hours    PHYSICAL EXAM  Constitutional: NAD  Neuro: A+O x 3, non-focal, speech clear and intact  CV: regular rate, regular rhythm, +S1S2, no murmurs or rub  Pulm/chest: lung sounds CTA and equal bilaterally, diminished at right base, no accessory muscle use noted  Abd: +BS, soft, NT, ND  Ext: FIELDS x 4, no C/C/E  Skin: warm, well perfused  Psych: calm, appropriate affect  Drains: Left posterior PTC to H2O seal, draining scant amount of thin serosanguineous fluid, no air leak, no SQ air, site c/d/i    Intake and Output   @ 07:  -   @ 07:00  --------------------------------------------------------  IN: 240 mL / OUT: 1200 mL / NET: -960 mL     @ 07:  -   @ 01:47  --------------------------------------------------------  IN: 500 mL / OUT: 470 mL / NET: 30 mL    Weights:  Daily     Daily Weight in k.7 (2023 06:59)  Admit Wt: Drug Dosing Weight  Height (cm): 172.7 (2023 08:30)  Weight (kg): 83.1 (2023 08:30)  BMI (kg/m2): 27.9 (2023 08:30)  BSA (m2): 1.97 (2023 08:30)    All laboratory results, radiology and medications reviewed.    LABS      136  |  100  |  44.3<H>  ----------------------------<  94  4.6   |  26.0  |  1.17    Ca    8.4      2023 05:56  Mg     2.5         TPro  5.8<L>  /  Alb  3.2<L>  /  TBili  0.6  /  DBili  x   /  AST  21  /  ALT  11  /  AlkPhos  82                                   9.1    6.57  )-----------( 209      ( 2023 05:56 )             28.3          < from: CT Chest No Cont (23 @ 10:07) >    INTERPRETATION:  Clinical information: Status post chest tube placement.   Exam is compared to previous study of 2022.    CT scan of the chest was obtained without administration of intravenous   contrast.    Few small lymph nodes are present in the mediastinum.    Heart is enlarged in size. Patient is status post mitral valve surgery.   No pericardial effusion is noted.    No endobronchial lesionsare noted. Atelectasis is noted involving   portions of the right middle and both lower lobes, more so involving the   left lower lobe. This is secondary to very small loculated left   hydropneumothorax containing pigtail catheter. The left pleural effusion   has markedly decreased in size when compared to previous exam.    Below the diaphragm, visualized portions of the abdomen demonstrate   low-attenuation lesion in the left kidney which is too small to be   adequately characterized on this exam. Stones are noted within the   gallbladder.    Degenerative changes of the spine are noted.    IMPRESSION: Small loculated left hydropneumothorax containing pigtail   catheter. The left pleural effusion has markedly decreased in size when   comparedto previous exam.    < end of copied text >

## 2023-01-06 DIAGNOSIS — Z29.9 ENCOUNTER FOR PROPHYLACTIC MEASURES, UNSPECIFIED: ICD-10-CM

## 2023-01-06 DIAGNOSIS — D62 ACUTE POSTHEMORRHAGIC ANEMIA: ICD-10-CM

## 2023-01-06 DIAGNOSIS — I48.20 CHRONIC ATRIAL FIBRILLATION, UNSPECIFIED: ICD-10-CM

## 2023-01-06 DIAGNOSIS — I44.2 ATRIOVENTRICULAR BLOCK, COMPLETE: ICD-10-CM

## 2023-01-06 LAB
ANION GAP SERPL CALC-SCNC: 8 MMOL/L — SIGNIFICANT CHANGE UP (ref 5–17)
BLD GP AB SCN SERPL QL: SIGNIFICANT CHANGE UP
BUN SERPL-MCNC: 25.4 MG/DL — HIGH (ref 8–20)
CALCIUM SERPL-MCNC: 8.6 MG/DL — SIGNIFICANT CHANGE UP (ref 8.4–10.5)
CHLORIDE SERPL-SCNC: 102 MMOL/L — SIGNIFICANT CHANGE UP (ref 96–108)
CO2 SERPL-SCNC: 27 MMOL/L — SIGNIFICANT CHANGE UP (ref 22–29)
CREAT SERPL-MCNC: 0.9 MG/DL — SIGNIFICANT CHANGE UP (ref 0.5–1.3)
CULTURE RESULTS: SIGNIFICANT CHANGE UP
EGFR: 85 ML/MIN/1.73M2 — SIGNIFICANT CHANGE UP
GLUCOSE SERPL-MCNC: 92 MG/DL — SIGNIFICANT CHANGE UP (ref 70–99)
HCT VFR BLD CALC: 28 % — LOW (ref 39–50)
HGB BLD-MCNC: 9.2 G/DL — LOW (ref 13–17)
MAGNESIUM SERPL-MCNC: 2.1 MG/DL — SIGNIFICANT CHANGE UP (ref 1.8–2.6)
MCHC RBC-ENTMCNC: 31.1 PG — SIGNIFICANT CHANGE UP (ref 27–34)
MCHC RBC-ENTMCNC: 32.9 GM/DL — SIGNIFICANT CHANGE UP (ref 32–36)
MCV RBC AUTO: 94.6 FL — SIGNIFICANT CHANGE UP (ref 80–100)
PLATELET # BLD AUTO: 215 K/UL — SIGNIFICANT CHANGE UP (ref 150–400)
POTASSIUM SERPL-MCNC: 4.7 MMOL/L — SIGNIFICANT CHANGE UP (ref 3.5–5.3)
POTASSIUM SERPL-SCNC: 4.7 MMOL/L — SIGNIFICANT CHANGE UP (ref 3.5–5.3)
RBC # BLD: 2.96 M/UL — LOW (ref 4.2–5.8)
RBC # FLD: 14.8 % — HIGH (ref 10.3–14.5)
SARS-COV-2 RNA SPEC QL NAA+PROBE: SIGNIFICANT CHANGE UP
SODIUM SERPL-SCNC: 137 MMOL/L — SIGNIFICANT CHANGE UP (ref 135–145)
SPECIMEN SOURCE: SIGNIFICANT CHANGE UP
WBC # BLD: 6.7 K/UL — SIGNIFICANT CHANGE UP (ref 3.8–10.5)
WBC # FLD AUTO: 6.7 K/UL — SIGNIFICANT CHANGE UP (ref 3.8–10.5)

## 2023-01-06 PROCEDURE — 33274 TCAT INSJ/RPL PERM LDLS PM: CPT

## 2023-01-06 PROCEDURE — 99233 SBSQ HOSP IP/OBS HIGH 50: CPT

## 2023-01-06 PROCEDURE — 99233 SBSQ HOSP IP/OBS HIGH 50: CPT | Mod: 25

## 2023-01-06 PROCEDURE — 71045 X-RAY EXAM CHEST 1 VIEW: CPT | Mod: 26

## 2023-01-06 PROCEDURE — 93010 ELECTROCARDIOGRAM REPORT: CPT

## 2023-01-06 PROCEDURE — 99232 SBSQ HOSP IP/OBS MODERATE 35: CPT

## 2023-01-06 RX ORDER — CEFAZOLIN SODIUM 1 G
2000 VIAL (EA) INJECTION EVERY 8 HOURS
Refills: 0 | Status: COMPLETED | OUTPATIENT
Start: 2023-01-06 | End: 2023-01-07

## 2023-01-06 RX ORDER — FUROSEMIDE 40 MG
40 TABLET ORAL ONCE
Refills: 0 | Status: COMPLETED | OUTPATIENT
Start: 2023-01-06 | End: 2023-01-06

## 2023-01-06 RX ORDER — CEFAZOLIN SODIUM 1 G
2000 VIAL (EA) INJECTION EVERY 8 HOURS
Refills: 0 | Status: DISCONTINUED | OUTPATIENT
Start: 2023-01-06 | End: 2023-01-06

## 2023-01-06 RX ADMIN — PANTOPRAZOLE SODIUM 40 MILLIGRAM(S): 20 TABLET, DELAYED RELEASE ORAL at 13:35

## 2023-01-06 RX ADMIN — SIMVASTATIN 20 MILLIGRAM(S): 20 TABLET, FILM COATED ORAL at 21:50

## 2023-01-06 RX ADMIN — Medication 1 MILLIGRAM(S): at 13:34

## 2023-01-06 RX ADMIN — Medication 325 MILLIGRAM(S): at 13:34

## 2023-01-06 RX ADMIN — Medication 40 MILLIGRAM(S): at 18:04

## 2023-01-06 RX ADMIN — SODIUM CHLORIDE 3 MILLILITER(S): 9 INJECTION INTRAMUSCULAR; INTRAVENOUS; SUBCUTANEOUS at 22:13

## 2023-01-06 RX ADMIN — SODIUM CHLORIDE 3 MILLILITER(S): 9 INJECTION INTRAMUSCULAR; INTRAVENOUS; SUBCUTANEOUS at 05:38

## 2023-01-06 RX ADMIN — Medication 2000 MILLIGRAM(S): at 16:58

## 2023-01-06 RX ADMIN — SODIUM CHLORIDE 3 MILLILITER(S): 9 INJECTION INTRAMUSCULAR; INTRAVENOUS; SUBCUTANEOUS at 13:35

## 2023-01-06 RX ADMIN — Medication 1 TABLET(S): at 13:35

## 2023-01-06 NOTE — PROGRESS NOTE ADULT - ASSESSMENT
82 year old male patient transferred from [facility] 12/31 with spontaneous hemothorax on Eliquis, now s/p left pigtail catheter placement 1/1 and removed 1/5 for drainage. Hospital course significant for mild hypotension secondary to hypovolemia from acute blood loss (s/p 1uPRBC 1/1), as well as Atrial fibrillation w/ RBBB, slow VR and intermittent heart block now w/p micra PPM

## 2023-01-06 NOTE — PROGRESS NOTE ADULT - ASSESSMENT
82 year old male patient transferred from [facility] 12/31 with spontaneous hemothorax on Eliquis, now s/p left pigtail catheter placement 1/1 and removed 1/5 for drainage. Hospital course significant for mild hypotension secondary to hypovolemia from acute blood loss (s/p 1uPRBC 1/1), as well as Atrial fibrillation w/ RBBB, slow VR and intermittent heart block (now planned for Micra PPM today 1/6).

## 2023-01-06 NOTE — PROGRESS NOTE ADULT - PROBLEM SELECTOR PLAN 2
- Atrial fibrillation with slow ventricular response. PVCs.   - EP following s/p micra PPM.  - Start metoprolol when cleared by EP.  - Previously on Eliquis, now with spontaneous hemothorax   - CHADSVASC (age, HTN) 3.   - Would resume AC when cleared by thoracic surgery and EP

## 2023-01-06 NOTE — PROCEDURE NOTE - NSUS ED ADDITIONAL DETAIL1 FT
Prominent fat pad was appreciated on parasternal long axis window. No effusion noted. Discussed with patient's son at bedside.

## 2023-01-06 NOTE — PROGRESS NOTE ADULT - ASSESSMENT
82M presented to [facility] via Ambulance on 12/29 with acute onset shortness of breath and left sided chest pain radiating down left arm.  He was hypoxic on admission requiring 3L NC, and relatively hypotensive with SBP in the 90's according to [facility] chart.  Cardiac workup revealed mildly elevated troponin likely secondary to demand ischemia in the setting of what was presumed pneumonia, with no abnormality on EKG except for RBBB and rate controlled afib.  CTangio chest r/o PE but revealed left sided effusion for which a thoracentesis was performed, yielding 300cc of jd blood.  Thoracic surgery was consulted and the patient was transferred to [facility] for management of spontaneous hemothorax.  He is on eliquis for chronic persistent atrial fibrillation.  He denies any sort of trauma to account for an etiology of this hemothorax (he was sitting down drinking coffee when initial symptoms began).     Appreciate above HPI. 82 y/op M with PMH HTN, heart failure unspecified, HLD, mitral ring, Afib on EliquisPt sitting up in bed, awaiting chest tube by CTsx. States history of MVR, possible clip to [place]. States no history of heart attack, stent, PCI in past.

## 2023-01-06 NOTE — PROGRESS NOTE ADULT - PROBLEM SELECTOR PLAN 1
Presented with Spontaneous hemothorax, non traumatic per patient.   S/p Left pigtail catheter placement 1/1 and removal 1/5.   Exudative effusion, culture negative.  Cytology negative for malignant cells.  Cardiology following. Inpatient vs outpatient ischemic work up.  Plan for Micra PPM later today as per EP given Afib w/ RBBB, slow VR, and intermittent heart block.  Will discuss timing of restarting Eliquis post procedure.   Plan to be discussed with Thoracic Surgery team in AM rounds.

## 2023-01-06 NOTE — PROGRESS NOTE ADULT - SUBJECTIVE AND OBJECTIVE BOX
[facility]                                                         CARDIOLOGY AT [place] [address]                                                         Telephone: [phone]. Fax:[phone]                                                                             PROGRESS NOTE    Reason for follow up: NSVT  Update: Patient underwent micra PPM placed today. Patient with no active complaints.       Review of symptoms:   Cardiac:  No chest pain. No dyspnea. No palpitations.  Respiratory: no cough. No dyspnea  Gastrointestinal: No diarrhea. No abdominal pain. No bleeding.   Neuro: No focal neuro complaints.    Vitals:  T(C): 36.7 (01-06-23 @ 06:52), Max: 36.8 (01-06-23 @ 05:36)  HR: 69 (01-06-23 @ 11:20) (51 - 69)  BP: 129/64 (01-06-23 @ 11:20) (111/59 - 129/64)  RR: 16 (01-06-23 @ 11:20) (14 - 17)  SpO2: 100% (01-06-23 @ 11:20) (90% - 100%)  Wt(kg): --  I&O's Summary    06 Jan 2023 07:01  -  06 Jan 2023 12:05  --------------------------------------------------------  IN: 0 mL / OUT: 300 mL / NET: -300 mL      PHYSICAL EXAM:  Appearance: Comfortable. No acute distress  HEENT:  Atraumatic. Normocephalic.  Normal oral mucosa  Neurologic: A & O x 3, no gross focal deficits.  Cardiovascular: RRR S1 S2,  no rubs/gallops. No JVD,   Respiratory: Decreased BS on left,   Gastrointestinal:  Soft, Non-tender, + BS  Lower Extremities: 2+ Peripheral Pulses, No clubbing, cyanosis, or edema  Psychiatry: Patient is calm. No agitation.   Skin: warm and dry.    CURRENT CARDIAC MEDICATIONS:      CURRENT OTHER MEDICATIONS:  ceFAZolin  Injectable. 2000 milliGRAM(s) IV Push every 8 hours  acetaminophen     Tablet .. 650 milliGRAM(s) Oral every 6 hours PRN Temp greater or equal to 38.5C (101.3F), Mild Pain (1 - 3)  oxyCODONE    IR 5 milliGRAM(s) Oral every 6 hours PRN Severe Pain (7 - 10)  pantoprazole    Tablet 40 milliGRAM(s) Oral before breakfast  simvastatin 20 milliGRAM(s) Oral at bedtime  ferrous    sulfate 325 milliGRAM(s) Oral daily  folic acid 1 milliGRAM(s) Oral daily  influenza  Vaccine (HIGH DOSE) 0.7 milliLiter(s) IntraMuscular once  lidocaine   4% Patch 1 Patch Transdermal daily  multivitamin 1 Tablet(s) Oral daily  sodium chloride 0.9% lock flush 3 milliLiter(s) IV Push every 8 hours      LABS:	 	  ( 01 Jan 2023 03:48 )  Troponin T  0.01 ,  CPK  X    , CKMB  X    , BNP X                                  9.2    6.70  )-----------( 215      ( 06 Jan 2023 05:51 )             28.0     01-06    137  |  102  |  25.4<H>  ----------------------------<  92  4.7   |  27.0  |  0.90    Ca    8.6      06 Jan 2023 05:51  Mg     2.1     01-06      PT/INR/PTT ( 01 Jan 2023 03:48 )                       :                       :      14.4         :       32.6                  .        .                   .              .           .       1.24        .                                       Lipid Profile:   HgA1c:   TSH: Thyroid Stimulating Hormone, Serum: 2.59 uIU/mL      TELEMETRY:   ECG:    DIAGNOSTIC TESTING:  [ ] Echocardiogram:   < from: TTE Echo Complete w/ Contrast w/ Doppler (01.02.23 @ 16:46) >  PHYSICIAN INTERPRETATION:  Left Ventricle: Endocardial visualization was enhanced with intravenous   echo contrast. The left ventricular internal cavity size is normal.  Global LV systolic function was normal. Left ventricular ejection  fraction, by visual estimation, is 60 to 65%. Abnormal (paradoxical)   septal motion consistent with post-operative status. The left ventricular   diastolic function could not be assessed in this study.  Right Ventricle: The right ventricular size is mildly enlarged. RV   systolic function is mildly reduced.  Left Atrium: Severely enlarged left atrium.  Right Atrium: Mildly enlarged right atrium.  Pericardium: There is no evidence of pericardial effusion.  Mitral Valve: Status-post mitral annular ring insertion. Mild mitral   valve regurgitation is seen. MVA BY continuity equation=1.6cm2. PHT   120msec, MG 3.06mmHg. Findings are suggestive of moderate mitral stenosis.  Tricuspid Valve: The tricuspid valve is normal in structure.   Mild-moderate tricuspid regurgitation is visualized. Estimated pulmonary   artery systolic pressure is 50.0 mmHg assuming a right atrial pressure of   15 mmHg, which is consistent with moderate pulmonary hypertension.  Aortic Valve: The aortic valve is trileaflet. Sclerotic aortic valve with   normal opening. Mild aortic valve regurgitation is seen.  Pulmonic Valve: The pulmonic valve was not well visualized. Mild pulmonic   valve regurgitation.  Aorta: There is dilatation of the aortic root.  Pulmonary Artery: The pulmonary artery is of normal size and origin.  Venous: The inferior vena cava was dilated, with respiratory size   variation less than 50%.  In comparison to the previous echocardiogram(s): There are no prior   studies on this patient for comparison purposes.      Summary:   1. Technically difficult study.   2. Left ventricular ejection fraction, by visual estimation, is 60 to   65%.   3. Endocardial visualization was enhanced with intravenous echo contrast.   4. Normal global left ventricular systolic function.   5. There is moderate concentric left ventricular hypertrophy.   6. The left ventricular diastolic function could not be assessed in this   study.   7. Mildly reduced RV systolic function.   8. Mildly enlarged right ventricle.   9. Mildly enlarged right atrium.  10. Severely enlarged left atrium.  11. Mild mitral valve regurgitation.  12. MVA BY continuity equation=1.6cm2. PHT 120msec, MG 3.06mmHg. Findings   are suggestive of moderate mitral stenosis.  13. Status-post mitral annular ring insertion.  14. Mild-moderate tricuspid regurgitation.  15. Mild aortic regurgitation.  16. Sclerotic aortic valve with normal opening.  17. Dilatation of the aortic root. 3.9cm  18. Estimated pulmonary artery systolic pressure is 50.0 mmHg assuming a   right atrial pressure of 15 mmHg, which is consistent with moderate   pulmonary hypertension.  19. Mild pulmonic valve regurgitation.    Abiodun James MD Electronically signed on 1/3/2023 at 8:37:44 AM    < end of copied text >    [ ]  Catheterization:  [ ] Stress Test:    OTHER: 	                                                                [facility]                                                         CARDIOLOGY AT [facility]                                                                  [address] [place] [address]                                                         Telephone: [phone]. Fax:[phone]                                                                             PROGRESS NOTE    Reason for follow up: NSVT  Update: Patient underwent micra PPM placed today. Patient with no active complaints.       Review of symptoms:   Cardiac:  No chest pain. No dyspnea. No palpitations.  Respiratory: no cough. No dyspnea  Gastrointestinal: No diarrhea. No abdominal pain. No bleeding.   Neuro: No focal neuro complaints.    Vitals:  T(C): 36.7 (01-06-23 @ 06:52), Max: 36.8 (01-06-23 @ 05:36)  HR: 69 (01-06-23 @ 11:20) (51 - 69)  BP: 129/64 (01-06-23 @ 11:20) (111/59 - 129/64)  RR: 16 (01-06-23 @ 11:20) (14 - 17)  SpO2: 100% (01-06-23 @ 11:20) (90% - 100%)  Wt(kg): --  I&O's Summary    06 Jan 2023 07:01  -  06 Jan 2023 12:05  --------------------------------------------------------  IN: 0 mL / OUT: 300 mL / NET: -300 mL      PHYSICAL EXAM:  Appearance: Comfortable. No acute distress  HEENT:  Atraumatic. Normocephalic.  Normal oral mucosa  Neurologic: A & O x 3, no gross focal deficits.  Cardiovascular: RRR S1 S2,  no rubs/gallops. + JVD,   Respiratory: Decreased BS on left,   Gastrointestinal:  Soft, Non-tender, + BS  Lower Extremities: 2+ Peripheral Pulses, No clubbing, cyanosis, trace right leg edema  Psychiatry: Patient is calm. No agitation.   Skin: warm and dry.    CURRENT CARDIAC MEDICATIONS:      CURRENT OTHER MEDICATIONS:  ceFAZolin  Injectable. 2000 milliGRAM(s) IV Push every 8 hours  acetaminophen     Tablet .. 650 milliGRAM(s) Oral every 6 hours PRN Temp greater or equal to 38.5C (101.3F), Mild Pain (1 - 3)  oxyCODONE    IR 5 milliGRAM(s) Oral every 6 hours PRN Severe Pain (7 - 10)  pantoprazole    Tablet 40 milliGRAM(s) Oral before breakfast  simvastatin 20 milliGRAM(s) Oral at bedtime  ferrous    sulfate 325 milliGRAM(s) Oral daily  folic acid 1 milliGRAM(s) Oral daily  influenza  Vaccine (HIGH DOSE) 0.7 milliLiter(s) IntraMuscular once  lidocaine   4% Patch 1 Patch Transdermal daily  multivitamin 1 Tablet(s) Oral daily  sodium chloride 0.9% lock flush 3 milliLiter(s) IV Push every 8 hours      LABS:	 	  ( 01 Jan 2023 03:48 )  Troponin T  0.01 ,  CPK  X    , CKMB  X    , BNP X                                  9.2    6.70  )-----------( 215      ( 06 Jan 2023 05:51 )             28.0     01-06    137  |  102  |  25.4<H>  ----------------------------<  92  4.7   |  27.0  |  0.90    Ca    8.6      06 Jan 2023 05:51  Mg     2.1     01-06      PT/INR/PTT ( 01 Jan 2023 03:48 )                       :                       :      14.4         :       32.6                  .        .                   .              .           .       1.24        .                                       Lipid Profile:   HgA1c:   TSH: Thyroid Stimulating Hormone, Serum: 2.59 uIU/mL      TELEMETRY:   ECG:    DIAGNOSTIC TESTING:  [ ] Echocardiogram:   < from: TTE Echo Complete w/ Contrast w/ Doppler (01.02.23 @ 16:46) >  PHYSICIAN INTERPRETATION:  Left Ventricle: Endocardial visualization was enhanced with intravenous   echo contrast. The left ventricular internal cavity size is normal.  Global LV systolic function was normal. Left ventricular ejection  fraction, by visual estimation, is 60 to 65%. Abnormal (paradoxical)   septal motion consistent with post-operative status. The left ventricular   diastolic function could not be assessed in this study.  Right Ventricle: The right ventricular size is mildly enlarged. RV   systolic function is mildly reduced.  Left Atrium: Severely enlarged left atrium.  Right Atrium: Mildly enlarged right atrium.  Pericardium: There is no evidence of pericardial effusion.  Mitral Valve: Status-post mitral annular ring insertion. Mild mitral   valve regurgitation is seen. MVA BY continuity equation=1.6cm2. PHT   120msec, MG 3.06mmHg. Findings are suggestive of moderate mitral stenosis.  Tricuspid Valve: The tricuspid valve is normal in structure.   Mild-moderate tricuspid regurgitation is visualized. Estimated pulmonary   artery systolic pressure is 50.0 mmHg assuming a right atrial pressure of   15 mmHg, which is consistent with moderate pulmonary hypertension.  Aortic Valve: The aortic valve is trileaflet. Sclerotic aortic valve with   normal opening. Mild aortic valve regurgitation is seen.  Pulmonic Valve: The pulmonic valve was not well visualized. Mild pulmonic   valve regurgitation.  Aorta: There is dilatation of the aortic root.  Pulmonary Artery: The pulmonary artery is of normal size and origin.  Venous: The inferior vena cava was dilated, with respiratory size   variation less than 50%.  In comparison to the previous echocardiogram(s): There are no prior   studies on this patient for comparison purposes.      Summary:   1. Technically difficult study.   2. Left ventricular ejection fraction, by visual estimation, is 60 to   65%.   3. Endocardial visualization was enhanced with intravenous echo contrast.   4. Normal global left ventricular systolic function.   5. There is moderate concentric left ventricular hypertrophy.   6. The left ventricular diastolic function could not be assessed in this   study.   7. Mildly reduced RV systolic function.   8. Mildly enlarged right ventricle.   9. Mildly enlarged right atrium.  10. Severely enlarged left atrium.  11. Mild mitral valve regurgitation.  12. MVA BY continuity equation=1.6cm2. PHT 120msec, MG 3.06mmHg. Findings   are suggestive of moderate mitral stenosis.  13. Status-post mitral annular ring insertion.  14. Mild-moderate tricuspid regurgitation.  15. Mild aortic regurgitation.  16. Sclerotic aortic valve with normal opening.  17. Dilatation of the aortic root. 3.9cm  18. Estimated pulmonary artery systolic pressure is 50.0 mmHg assuming a   right atrial pressure of 15 mmHg, which is consistent with moderate   pulmonary hypertension.  19. Mild pulmonic valve regurgitation.    Abiodun James MD Electronically signed on 1/3/2023 at 8:37:44 AM    < end of copied text >    [ ]  Catheterization:  [ ] Stress Test:    OTHER:

## 2023-01-06 NOTE — PROGRESS NOTE ADULT - PROBLEM SELECTOR PLAN 1
Presented with Spontaneous hemothorax, non traumatic per patient.   S/p Left pigtail catheter placement 1/1 and removal 1/5.   Exudative effusion, culture negative.  Cytology negative for malignant cells.  Cardiology following. Inpatient vs outpatient ischemic work up.  s/p micra PPM (for PA lateral 1/7, groin stitch to be removed 1/7)   Will discuss timing of restarting Eliquis post procedure.   Plan to be discussed with Thoracic Surgery team in AM rounds.

## 2023-01-06 NOTE — PROGRESS NOTE ADULT - SUBJECTIVE AND OBJECTIVE BOX
ELECTROPHYSIOLOGY BRIEF POST-OP NOTE    I have personally seen and examined the patient today in cath lab holding area spot 5. No acute post operative complaints.    PRE-OP DIAGNOSIS: AFib with CHB    POST-OP DIAGNOSIS: same    PROCEDURE: Leadless pacemaker insertion via right femoral vein. (Medtronic Micra)    COMPLICATIONS: None    CARDIOMYOPATHY: NO    IMPLANT EF%: 60-65%    Physician: Shukri Pal MD  Assistant: None    ESTIMATED BLOOD LOSS:  <10 mL    ANESTHESIA TYPE:  [   ]General Anesthesia  [ x ]Sedation  [   ]Local/Regional    CONDITION:  [  ]Critical  [  ]Serious  [ x ]Stable  [  ]Good    SPECIMENS REMOVED (if applicable): None    IMPLANT (if applicable): Medtronic Micra Leadless pacemaker insertion    PROGRAMMED: VVIR 70-110ppm     EKG: pending    Vital Signs   HR: 69 (06 Jan 2023 09:35) (51 - 69)  BP: 116/63 (06 Jan 2023 09:35) (111/68 - 140/66)  RR: 14 (06 Jan 2023 09:35) (14 - 18)  SpO2: 93% (06 Jan 2023 09:35) (93% - 97%)    Physical Exam:  Constitutional: NAD, AAOx3  Cardiovascular: +S1S2 RRR  Pulmonary: Coarse breath sounds at bases; left pigtail removed  GI: soft NTND +BS  Extremities: no pedal edema,  Right groin: Figure 8 suture with a dressing is CDI; no evidence of hematoma  Neuro: non focal, FIELDS x4    A/P  82 year old male patient transferred from [place] on 12/31 with spontaneous hemothorax on Eliquis, now s/p left pigtail catheter placement 1/1 and removed 1/5 for drainage. Hospital course significant for mild hypotension secondary to hypovolemia from acute blood loss (s/p 1uPRBC 1/1), as well as Atrial fibrillation w/ RBBB, slow VR and intermittent heart block. Patient now s/p successful and uncomplicated Medtronic Micra leadless pacemaker insertion via right femoral vein. 1 deployment.      -Strict Bedrest x 6 hours.  -Ancef 2gram IV Q8hr x 2 more doses  -Chest X-ray PA and LAT in AM to eval device position  -NO LOVENOX OR HEPARIN INCLUDING SQ UNLESS CLEARED BY EP  -Will defer restarting anticoagulation to Thoracic in light of spontaneous hemothorax.   -May have outpatient MERI to assess TOOTIE clip for residual stump. Dedicated CT chest with contrast may be an alternative. If TOOTIE cleared he can come off long term anticoagulation.   -Figure 8 suture to be removed by EP staff in AM  -AM labs and EKG  -Medtronic check in AM  -Will follow up in AM.

## 2023-01-06 NOTE — PROGRESS NOTE ADULT - NS ATTEND AMEND GEN_ALL_CORE FT
.  .  S/p successful leadless pacemaker implantation for intermittent complete heart block.    Regarding anticoagulation for AF, he is s/p TOOTIE clip which should be evaluated with MERI to assess degree of closure. I spoke to his cardiologist & EP (Dr. Cox & Richard, respectively) who report that he has not had a MERI after clip. Would be OK with holding AC for now but he should get MERI as outpatient to assess closure to guide anticoagulation management going forward.    Shukri Pal MD  Clinical Cardiac Electrophysiology .  .  S/p successful leadless pacemaker implantation for intermittent complete heart block.    Regarding anticoagulation for AF, he is s/p TOOTIE clip which should be evaluated with MERI to assess degree of closure. I spoke to his cardiologist & EP (Dr. Cox & Richard, respectively) who report that he has not had a MERI after clip. Would be OK with holding AC for now but he should get MERI as outpatient to assess closure to guide anticoagulation management going forward.    OK to start beta blocker to suppress NSVT and PVCs.    Shukri Pal MD  Clinical Cardiac Electrophysiology

## 2023-01-06 NOTE — PROGRESS NOTE ADULT - PROBLEM SELECTOR PLAN 1
- Patient with elevated troponin while at [place].   - Troponin here are negative.   - Patient's echocardiogram with normal EF, no RWMA, mildly reduced RV, moderate MS, mild to mod TR, moderate pulmonary HTN.   - Patient with 11 beats of NSVT overnight 01/04/22.   - EP following s/p micra PPM. Start beta blockade per EP.   - Due to recent hemothorax, defer ischemic evaluation to an outpatient.   - Continue statin. - Patient with elevated troponin while at [place].   - Troponin here are negative.   - Patient's echocardiogram with normal EF, no RWMA, mildly reduced RV, moderate MS, mild to mod TR, moderate pulmonary HTN.   - Patient with some fluid overload and diuresis on hold.   - Lasix 40mg IV x 1 tonight.   - Then start Torsemide 20mg PO qday tomorrow.   - Patient with 11 beats of NSVT overnight 01/04/22.   - EP following s/p micra PPM. Start beta blockade per EP.   - Due to recent hemothorax, defer ischemic evaluation to an outpatient.   - Continue statin.

## 2023-01-06 NOTE — PROGRESS NOTE ADULT - PROBLEM SELECTOR PLAN 3
- Patient with 11 beats of NSVT.   - Beta blockade.  - Keep K>4, Mg>2.   - Likely outpatient ischemic evaluation.

## 2023-01-06 NOTE — PROGRESS NOTE ADULT - SUBJECTIVE AND OBJECTIVE BOX
Spontaneous hemothorax s/p left pigtail 1/1 (removed 1/5)    PAST MEDICAL & SURGICAL HISTORY:  HTN (hypertension)  HLD (hyperlipidemia)  H/O mitral valve repair  H/O hernia repair    Brief Hospital Course: 82 year old male patient transferred from [facility] 12/31 with spontaneous hemothorax on Eliquis, now s/p left pigtail catheter placement 1/1 and removed 1/5 for drainage. Hospital course significant for mild hypotension secondary to hypovolemia from acute blood loss (s/p 1uPRBC 1/1), as well as Atrial fibrillation w/ RBBB, slow VR and intermittent heart block (now planned for Micra PPM today 1/6).    Significant recent/past 24 hr events: No overnight events reported.    Subjective: Patient lying in bed in NAD. +Tolerating diet. +Passing gas. +Pain currently controlled. Denies fevers, chills, lightheadedness, dizziness, HA, CP, palpitations, SOB, cough, abdominal pain, N/V, diarrhea, numbness/tingling in extremities, or any other acute complaints. ROS negative x 10 systems except as noted above.    MEDICATIONS  (STANDING):  ferrous    sulfate 325 milliGRAM(s) Oral daily  folic acid 1 milliGRAM(s) Oral daily  influenza  Vaccine (HIGH DOSE) 0.7 milliLiter(s) IntraMuscular once  lidocaine   4% Patch 1 Patch Transdermal daily  multivitamin 1 Tablet(s) Oral daily  pantoprazole    Tablet 40 milliGRAM(s) Oral before breakfast  simvastatin 20 milliGRAM(s) Oral at bedtime  sodium chloride 0.9% lock flush 3 milliLiter(s) IV Push every 8 hours    MEDICATIONS  (PRN):  acetaminophen     Tablet .. 650 milliGRAM(s) Oral every 6 hours PRN Temp greater or equal to 38.5C (101.3F), Mild Pain (1 - 3)  oxyCODONE    IR 5 milliGRAM(s) Oral every 6 hours PRN Severe Pain (7 - 10)    Allergies: spironolactone (Rash)    Vitals   T(C): 36.7 (06 Jan 2023 00:09), Max: 36.8 (05 Jan 2023 12:05)  T(F): 98.1 (06 Jan 2023 00:09), Max: 98.2 (05 Jan 2023 12:05)  HR: 54 (06 Jan 2023 00:09) (50 - 68)  BP: 128/65 (06 Jan 2023 00:09) (116/64 - 140/66)  RR: 17 (06 Jan 2023 00:09) (17 - 18)  SpO2: 95% (06 Jan 2023 00:09) (94% - 100%)  O2 Parameters below as of 06 Jan 2023 00:09  Patient On (Oxygen Delivery Method): room air    I&O's Detail    04 Jan 2023 07:01  -  05 Jan 2023 07:00  --------------------------------------------------------  IN:    Oral Fluid: 500 mL  Total IN: 500 mL    OUT:    Chest Tube (mL): 120 mL    Voided (mL): 350 mL  Total OUT: 470 mL    Total NET: 30 mL    Physical Exam  Neuro: A+O x 3, non-focal, speech clear and intact  HEENT:  NCAT, No conjuctival edema or icterus, no thrush.    Neck:  Supple, trachea midline  Pulm: CTA bilaterally, no accessory muscle use noted  Chest: Dressing over former pigtail site C/D/I  CV: Anup rate, regular rhythm, +S1S2, no murmur noted  Abd: soft, NT, ND, + BS  Ext: FIELDS x 4, no edema, no cyanosis, distal motor/neuro/circ intact  Skin: warm, dry, perfused    LABS                        9.1    5.44  )-----------( 231      ( 05 Jan 2023 06:00 )             28.4     01-05    136  |  103  |  31.0<H>  ----------------------------<  91  4.7   |  28.0  |  1.00    Ca    8.3<L>      05 Jan 2023 06:00  Mg     2.5     01-05      Last CXR:  < from: Xray Chest 1 View- PORTABLE-Routine (Xray Chest 1 View- PORTABLE-Routine in AM.) (01.04.23 @ 05:47) >  Left-sided chest tube again noted. Stable opacification left lung base.   Likely trace left-sided pneumothorax laterally diminished from January 2.   Stable blunting right costophrenic angle.  IMPRESSION: Diminished left-sided pneumothorax.  < end of copied text >

## 2023-01-06 NOTE — PROGRESS NOTE ADULT - SUBJECTIVE AND OBJECTIVE BOX
SUBJECTIVE   without acute issues   "i feel amazing"   patient admits to feeling energized and like a new man post PPM     INTERIM HISTORY SIGNIFICANT FOR   s/p micra ppm   without acute issues   now off bedrest   eating in chair     Patient is a 82y old  Male who presents with a chief complaint of Hemothorax (06 Jan 2023 12:04)    HPI:  82M presented to [facility] via Ambulance on 12/29 with acute onset shortness of breath and left sided chest pain radiating down left arm.  He was hypoxic on admission requiring 3L NC, and relatively hypotensive with SBP in the 90's according to [place] chart.   Cardiac workup revealed mildly elevated troponin likely secondary to demand ischemia in the setting of what was presumed pneumonia, with no abnormality on EKG except for RBBB and rate controlled afib.  CTangio chest r/o PE but revealed left sided effusion for which a thoracentesis was performed, yielding 300cc of jd blood.  Thoracic surgery was consulted and the patient was transferred to [facility] for management of spontaneous hemothorax.  He is on eliquis for chronic persistent atrial fibrillation.  He denies any sort of trauma to account for an etiology of this hemothorax (he was sitting down drinking coffee when initial symptoms began).  He denies back pain, abdominal pain, dyspepsia, headache, syncope, visual changes, motor dysfunction/weakness, dysuria, dysphagia, cough, hemoptysis, palpitations, N/V/D.  He is currently in no acute distress, with 1/10 chest pain, and no SOB at rest.   (01 Jan 2023 02:08)    OBJECTIVE  PAST MEDICAL & SURGICAL HISTORY:  HTN (hypertension)      HLD (hyperlipidemia)      H/O mitral valve repair      H/O hernia repair        spironolactone (Rash)    Home Medications:  Eliquis 5 mg oral tablet: 1 tab(s) orally 2 times a day (01 Jan 2023 08:03)  Lasix 20 mg oral tablet: 1 tab(s) orally once a day (01 Jan 2023 08:03)  lisinopril 20 mg oral tablet: 1 tab(s) orally 2 times a day (01 Jan 2023 08:03)  Norvasc 5 mg oral tablet: 1 tab(s) orally once a day (01 Jan 2023 08:03)  oxyCODONE 5 mg oral tablet: 1 tab(s) orally every 6 hours (01 Jan 2023 08:03)  simvastatin 20 mg oral tablet: 1 tab(s) orally once a day (at bedtime) (01 Jan 2023 08:03)    VITALS  Currently in sinus rhythm with vitals as below  ICU Vital Signs Last 24 Hrs  T(C): 36.5 (06 Jan 2023 16:25), Max: 36.8 (06 Jan 2023 05:36)  T(F): 97.7 (06 Jan 2023 16:25), Max: 98.2 (06 Jan 2023 05:36)  HR: 69 (06 Jan 2023 16:25) (51 - 69)  BP: 115/54 (06 Jan 2023 16:25) (111/59 - 129/64)  BP(mean): 74 (06 Jan 2023 16:25) (74 - 74)  ABP: --  ABP(mean): --  RR: 17 (06 Jan 2023 16:25) (14 - 17)  SpO2: 98% (06 Jan 2023 16:25) (90% - 100%)    O2 Parameters below as of 06 Jan 2023 16:25  Patient On (Oxygen Delivery Method): room air            LABS                        9.2    6.70  )-----------( 215      ( 06 Jan 2023 05:51 )             28.0   01-06    137  |  102  |  25.4<H>  ----------------------------<  92  4.7   |  27.0  |  0.90    Ca    8.6      06 Jan 2023 05:51  Mg     2.1     01-06    CAPILLARY BLOOD GLUCOSE        IN/OUT    01-06-23 @ 07:01  -  01-06-23 @ 16:26  --------------------------------------------------------  IN: 0 mL / OUT: 300 mL / NET: -300 mL      IMAGING  personally reviewed imaging     CURRENT MEDICATIONS  MEDICATIONS  (STANDING):  ceFAZolin  Injectable. 2000 milliGRAM(s) IV Push every 8 hours  ferrous    sulfate 325 milliGRAM(s) Oral daily  folic acid 1 milliGRAM(s) Oral daily  influenza  Vaccine (HIGH DOSE) 0.7 milliLiter(s) IntraMuscular once  lidocaine   4% Patch 1 Patch Transdermal daily  multivitamin 1 Tablet(s) Oral daily  pantoprazole    Tablet 40 milliGRAM(s) Oral before breakfast  simvastatin 20 milliGRAM(s) Oral at bedtime  sodium chloride 0.9% lock flush 3 milliLiter(s) IV Push every 8 hours    MEDICATIONS  (PRN):  acetaminophen     Tablet .. 650 milliGRAM(s) Oral every 6 hours PRN Temp greater or equal to 38.5C (101.3F), Mild Pain (1 - 3)  oxyCODONE    IR 5 milliGRAM(s) Oral every 6 hours PRN Severe Pain (7 - 10)

## 2023-01-06 NOTE — PROGRESS NOTE ADULT - PROBLEM SELECTOR PLAN 3
Plan for Micra PPM later today as per EP given Afib w/ RBBB, slow VR, and intermittent heart block.  Will discuss timing of restarting Eliquis post procedure.

## 2023-01-06 NOTE — PROGRESS NOTE ADULT - NS ATTEND AMEND GEN_ALL_CORE FT
s/p micra pacemaker.  appears fluid overloaded. Bari v waves. suspect severe TR.    restart oral diruesis.  will give one dose of IV diuresis  outpaitent evaln of his valvular heart disease

## 2023-01-07 ENCOUNTER — TRANSCRIPTION ENCOUNTER (OUTPATIENT)
Age: 83
End: 2023-01-07

## 2023-01-07 VITALS
HEART RATE: 77 BPM | OXYGEN SATURATION: 97 % | DIASTOLIC BLOOD PRESSURE: 67 MMHG | TEMPERATURE: 98 F | RESPIRATION RATE: 18 BRPM | SYSTOLIC BLOOD PRESSURE: 122 MMHG

## 2023-01-07 LAB
ANION GAP SERPL CALC-SCNC: 9 MMOL/L — SIGNIFICANT CHANGE UP (ref 5–17)
BASOPHILS # BLD AUTO: 0.04 K/UL — SIGNIFICANT CHANGE UP (ref 0–0.2)
BASOPHILS NFR BLD AUTO: 0.5 % — SIGNIFICANT CHANGE UP (ref 0–2)
BUN SERPL-MCNC: 25.6 MG/DL — HIGH (ref 8–20)
CALCIUM SERPL-MCNC: 8.7 MG/DL — SIGNIFICANT CHANGE UP (ref 8.4–10.5)
CHLORIDE SERPL-SCNC: 101 MMOL/L — SIGNIFICANT CHANGE UP (ref 96–108)
CO2 SERPL-SCNC: 29 MMOL/L — SIGNIFICANT CHANGE UP (ref 22–29)
CREAT SERPL-MCNC: 1.08 MG/DL — SIGNIFICANT CHANGE UP (ref 0.5–1.3)
EGFR: 68 ML/MIN/1.73M2 — SIGNIFICANT CHANGE UP
EOSINOPHIL # BLD AUTO: 0.42 K/UL — SIGNIFICANT CHANGE UP (ref 0–0.5)
EOSINOPHIL NFR BLD AUTO: 5.6 % — SIGNIFICANT CHANGE UP (ref 0–6)
GLUCOSE SERPL-MCNC: 104 MG/DL — HIGH (ref 70–99)
HCT VFR BLD CALC: 28.5 % — LOW (ref 39–50)
HGB BLD-MCNC: 9.3 G/DL — LOW (ref 13–17)
IMM GRANULOCYTES NFR BLD AUTO: 0.5 % — SIGNIFICANT CHANGE UP (ref 0–0.9)
LYMPHOCYTES # BLD AUTO: 0.8 K/UL — LOW (ref 1–3.3)
LYMPHOCYTES # BLD AUTO: 10.7 % — LOW (ref 13–44)
MAGNESIUM SERPL-MCNC: 2.1 MG/DL — SIGNIFICANT CHANGE UP (ref 1.6–2.6)
MCHC RBC-ENTMCNC: 31.3 PG — SIGNIFICANT CHANGE UP (ref 27–34)
MCHC RBC-ENTMCNC: 32.6 GM/DL — SIGNIFICANT CHANGE UP (ref 32–36)
MCV RBC AUTO: 96 FL — SIGNIFICANT CHANGE UP (ref 80–100)
MONOCYTES # BLD AUTO: 0.92 K/UL — HIGH (ref 0–0.9)
MONOCYTES NFR BLD AUTO: 12.3 % — SIGNIFICANT CHANGE UP (ref 2–14)
NEUTROPHILS # BLD AUTO: 5.26 K/UL — SIGNIFICANT CHANGE UP (ref 1.8–7.4)
NEUTROPHILS NFR BLD AUTO: 70.4 % — SIGNIFICANT CHANGE UP (ref 43–77)
PHOSPHATE SERPL-MCNC: 3.6 MG/DL — SIGNIFICANT CHANGE UP (ref 2.4–4.7)
PLATELET # BLD AUTO: 230 K/UL — SIGNIFICANT CHANGE UP (ref 150–400)
POTASSIUM SERPL-MCNC: 4.6 MMOL/L — SIGNIFICANT CHANGE UP (ref 3.5–5.3)
POTASSIUM SERPL-SCNC: 4.6 MMOL/L — SIGNIFICANT CHANGE UP (ref 3.5–5.3)
RBC # BLD: 2.97 M/UL — LOW (ref 4.2–5.8)
RBC # FLD: 14.4 % — SIGNIFICANT CHANGE UP (ref 10.3–14.5)
SODIUM SERPL-SCNC: 139 MMOL/L — SIGNIFICANT CHANGE UP (ref 135–145)
WBC # BLD: 7.48 K/UL — SIGNIFICANT CHANGE UP (ref 3.8–10.5)
WBC # FLD AUTO: 7.48 K/UL — SIGNIFICANT CHANGE UP (ref 3.8–10.5)

## 2023-01-07 PROCEDURE — 86850 RBC ANTIBODY SCREEN: CPT

## 2023-01-07 PROCEDURE — 85025 COMPLETE CBC W/AUTO DIFF WBC: CPT

## 2023-01-07 PROCEDURE — 93005 ELECTROCARDIOGRAM TRACING: CPT

## 2023-01-07 PROCEDURE — 88112 CYTOPATH CELL ENHANCE TECH: CPT

## 2023-01-07 PROCEDURE — U0005: CPT

## 2023-01-07 PROCEDURE — U0003: CPT

## 2023-01-07 PROCEDURE — 84484 ASSAY OF TROPONIN QUANT: CPT

## 2023-01-07 PROCEDURE — 83605 ASSAY OF LACTIC ACID: CPT

## 2023-01-07 PROCEDURE — 85027 COMPLETE CBC AUTOMATED: CPT

## 2023-01-07 PROCEDURE — 93010 ELECTROCARDIOGRAM REPORT: CPT

## 2023-01-07 PROCEDURE — 71046 X-RAY EXAM CHEST 2 VIEWS: CPT

## 2023-01-07 PROCEDURE — 82945 GLUCOSE OTHER FLUID: CPT

## 2023-01-07 PROCEDURE — 82042 OTHER SOURCE ALBUMIN QUAN EA: CPT

## 2023-01-07 PROCEDURE — C1786: CPT

## 2023-01-07 PROCEDURE — P9040: CPT

## 2023-01-07 PROCEDURE — 71045 X-RAY EXAM CHEST 1 VIEW: CPT

## 2023-01-07 PROCEDURE — 84157 ASSAY OF PROTEIN OTHER: CPT

## 2023-01-07 PROCEDURE — 83615 LACTATE (LD) (LDH) ENZYME: CPT

## 2023-01-07 PROCEDURE — 83986 ASSAY PH BODY FLUID NOS: CPT

## 2023-01-07 PROCEDURE — 85610 PROTHROMBIN TIME: CPT

## 2023-01-07 PROCEDURE — 87075 CULTR BACTERIA EXCEPT BLOOD: CPT

## 2023-01-07 PROCEDURE — 89051 BODY FLUID CELL COUNT: CPT

## 2023-01-07 PROCEDURE — 81001 URINALYSIS AUTO W/SCOPE: CPT

## 2023-01-07 PROCEDURE — 85379 FIBRIN DEGRADATION QUANT: CPT

## 2023-01-07 PROCEDURE — 84443 ASSAY THYROID STIM HORMONE: CPT

## 2023-01-07 PROCEDURE — 80053 COMPREHEN METABOLIC PANEL: CPT

## 2023-01-07 PROCEDURE — 84145 PROCALCITONIN (PCT): CPT

## 2023-01-07 PROCEDURE — C1769: CPT

## 2023-01-07 PROCEDURE — 84100 ASSAY OF PHOSPHORUS: CPT

## 2023-01-07 PROCEDURE — 99232 SBSQ HOSP IP/OBS MODERATE 35: CPT | Mod: 24

## 2023-01-07 PROCEDURE — 71046 X-RAY EXAM CHEST 2 VIEWS: CPT | Mod: 26

## 2023-01-07 PROCEDURE — 71250 CT THORAX DX C-: CPT

## 2023-01-07 PROCEDURE — 88305 TISSUE EXAM BY PATHOLOGIST: CPT

## 2023-01-07 PROCEDURE — 86901 BLOOD TYPING SEROLOGIC RH(D): CPT

## 2023-01-07 PROCEDURE — C1894: CPT

## 2023-01-07 PROCEDURE — 71045 X-RAY EXAM CHEST 1 VIEW: CPT | Mod: 26,77

## 2023-01-07 PROCEDURE — 87070 CULTURE OTHR SPECIMN AEROBIC: CPT

## 2023-01-07 PROCEDURE — 36430 TRANSFUSION BLD/BLD COMPNT: CPT

## 2023-01-07 PROCEDURE — P9016: CPT

## 2023-01-07 PROCEDURE — 83735 ASSAY OF MAGNESIUM: CPT

## 2023-01-07 PROCEDURE — 86923 COMPATIBILITY TEST ELECTRIC: CPT

## 2023-01-07 PROCEDURE — 36415 COLL VENOUS BLD VENIPUNCTURE: CPT

## 2023-01-07 PROCEDURE — 99231 SBSQ HOSP IP/OBS SF/LOW 25: CPT

## 2023-01-07 PROCEDURE — C8929: CPT

## 2023-01-07 PROCEDURE — 85730 THROMBOPLASTIN TIME PARTIAL: CPT

## 2023-01-07 PROCEDURE — 80048 BASIC METABOLIC PNL TOTAL CA: CPT

## 2023-01-07 PROCEDURE — 86900 BLOOD TYPING SEROLOGIC ABO: CPT

## 2023-01-07 PROCEDURE — 71045 X-RAY EXAM CHEST 1 VIEW: CPT | Mod: 26

## 2023-01-07 PROCEDURE — 87205 SMEAR GRAM STAIN: CPT

## 2023-01-07 PROCEDURE — 33274 TCAT INSJ/RPL PERM LDLS PM: CPT

## 2023-01-07 RX ORDER — AMLODIPINE BESYLATE 2.5 MG/1
1 TABLET ORAL
Qty: 0 | Refills: 0 | DISCHARGE

## 2023-01-07 RX ORDER — APIXABAN 2.5 MG/1
1 TABLET, FILM COATED ORAL
Qty: 0 | Refills: 0 | DISCHARGE

## 2023-01-07 RX ORDER — LISINOPRIL 2.5 MG/1
1 TABLET ORAL
Qty: 0 | Refills: 0 | DISCHARGE

## 2023-01-07 RX ORDER — OXYCODONE HYDROCHLORIDE 5 MG/1
1 TABLET ORAL
Qty: 0 | Refills: 0 | DISCHARGE

## 2023-01-07 RX ORDER — ACETAMINOPHEN 500 MG
2 TABLET ORAL
Qty: 0 | Refills: 0 | DISCHARGE
Start: 2023-01-07

## 2023-01-07 RX ORDER — OXYCODONE HYDROCHLORIDE 5 MG/1
1 TABLET ORAL
Qty: 16 | Refills: 0
Start: 2023-01-07 | End: 2023-01-10

## 2023-01-07 RX ADMIN — Medication 2000 MILLIGRAM(S): at 01:12

## 2023-01-07 RX ADMIN — SODIUM CHLORIDE 3 MILLILITER(S): 9 INJECTION INTRAMUSCULAR; INTRAVENOUS; SUBCUTANEOUS at 05:14

## 2023-01-07 RX ADMIN — SODIUM CHLORIDE 3 MILLILITER(S): 9 INJECTION INTRAMUSCULAR; INTRAVENOUS; SUBCUTANEOUS at 12:03

## 2023-01-07 RX ADMIN — PANTOPRAZOLE SODIUM 40 MILLIGRAM(S): 20 TABLET, DELAYED RELEASE ORAL at 05:15

## 2023-01-07 RX ADMIN — Medication 20 MILLIGRAM(S): at 05:16

## 2023-01-07 NOTE — DISCHARGE NOTE PROVIDER - PROVIDER TOKENS
PROVIDER:[TOKEN:[[id number]],FOLLOWUP:[2 weeks]],PROVIDER:[TOKEN:[[id number]],FOLLOWUP:[1 week]] PROVIDER:[TOKEN:[[id number]],FOLLOWUP:[2 weeks]],PROVIDER:[TOKEN:[[id number]],FOLLOWUP:[1 week]],PROVIDER:[TOKEN:[[id number]],FOLLOWUP:[2 weeks]]

## 2023-01-07 NOTE — PROGRESS NOTE ADULT - REASON FOR ADMISSION
Hemothorax

## 2023-01-07 NOTE — DISCHARGE NOTE PROVIDER - CARE PROVIDER_API CALL
Shukri Pal)  Cardiac Electrophysiology; Cardiology  [address]  [place] [address]  Phone: [phone]  Fax: [phone]  Follow Up Time: 2 weeks    Abdiel Barney)  Surgery; Thoracic Surgery  [address] [place] [address]  Phone: [phone]  Fax: [phone]  Follow Up Time: 1 week   Shukri Pal)  Cardiac Electrophysiology; Cardiology  [address]  [place] [address]  Phone: [phone]  Fax: [phone]  Follow Up Time: 2 weeks    Abdiel Barney)  Surgery; Thoracic Surgery  [address] [place] [address]  Phone: [phone]  Fax: [phone]  Follow Up Time: 1 week    Samantha Blum)  Cardiology; Internal Medicine  [address], [address]  [place], [place] [id number]  Phone: [phone]  Fax: [phone]  Follow Up Time: 2 weeks

## 2023-01-07 NOTE — DISCHARGE NOTE PROVIDER - HOSPITAL COURSE
82 year old male patient transferred from [facility]  12/31 with spontaneous hemothorax on Eliquis, now s/p left pigtail catheter placement 1/1 and removed 1/5 for drainage. Hospital course significant for mild hypotension secondary to hypovolemia from acute blood loss (s/p 1uPRBC 1/1), as well as Atrial fibrillation w/ RBBB, slow VR and intermittent heart block now s/p Micra PPM 1/6/23.   1/7 PA/;at CXR reviewed by EP>cleared for discharge

## 2023-01-07 NOTE — PROGRESS NOTE ADULT - SUBJECTIVE AND OBJECTIVE BOX
EP follow up: POD #1 s/p MDT Toyin VR ppm via RFV  right groin suture removed without incident       ECG: ventricular paced, underlying AF at 70bpm  TELE: vpaced 70bpm  post op beside POCUS 1/6/2023 negative for pericardial effusion  MDT Interrogation: full report in chart.VVIR 70-110bpm,540ohms/6.4.mV/0.38V@0.24ms  CXR PA/LAT 1/7/2023: pending     MEDICATIONS  (STANDING):  ferrous    sulfate 325 milliGRAM(s) Oral daily  folic acid 1 milliGRAM(s) Oral daily  influenza  Vaccine (HIGH DOSE) 0.7 milliLiter(s) IntraMuscular once  lidocaine   4% Patch 1 Patch Transdermal daily  multivitamin 1 Tablet(s) Oral daily  pantoprazole    Tablet 40 milliGRAM(s) Oral before breakfast  simvastatin 20 milliGRAM(s) Oral at bedtime  sodium chloride 0.9% lock flush 3 milliLiter(s) IV Push every 8 hours  torsemide 20 milliGRAM(s) Oral daily    Vital Signs Last 24 Hrs  T(C): 36.7 (07 Jan 2023 08:06), Max: 37.2 (07 Jan 2023 05:00)  T(F): 98 (07 Jan 2023 08:06), Max: 99 (07 Jan 2023 05:00)  HR: 72 (07 Jan 2023 08:06) (64 - 72)  BP: 127/72 (07 Jan 2023 08:06) (115/54 - 140/74)  BP(mean): 74 (06 Jan 2023 16:25) (74 - 74)  RR: 17 (07 Jan 2023 08:06) (15 - 18)  SpO2: 98% (07 Jan 2023 08:06) (90% - 100%)    Parameters below as of 07 Jan 2023 08:06  Patient On (Oxygen Delivery Method): room air    Physical Exam:  Constitutional: NAD, AAOx3  Cardiovascular: +S1S2 RRR  Pulmonary: unlabored, right CTA, left decreased   Extremities: no pedal edema, +distal pulses b/l  right groin access site suture removed, no bleeding, swelling or hematoma. new dsg placed c/d/i  Neuro: non focal, FIELDS x4    LABS:                        9.3    7.48  )-----------( 230      ( 07 Jan 2023 06:55 )             28.5     01-07    139  |  101  |  25.6<H>  ----------------------------<  104<H>  4.6   |  29.0  |  1.08    Ca    8.7      07 Jan 2023 06:55  Phos  3.6     01-07  Mg     2.1     01-07      A/P:  82 year old male with hypertension, hyperlipidemia, severe mitral regurgitation s/p mitral ring and TOOTIE clip (at [facility] with Dr. Ayoub) and longstanding persistent atrial fibrillation (on Apixaban at admission, rate controlled) who presented to [facility] with dyspnea found to have left hemothorax. He was transferred to [facility] 1/1/23 and is now s/p left pigtail catheter placement 1/1 and removed 1/5. While hospitalized he was noted to have AF with slow ventricular response and periods of regularization consistent with complete heart block and asymptomatic NSVT.  TTE revealed preserved EF. 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  Patient now POD #1 s/p successful and uncomplicated Medtronic Micra leadless pacemaker insertion via right femoral vein.     - right groin suture removed, new dsg placed, can be removed by pt in am. Groin care and restrictions reviewed with pt.  - CXR PA/LAT pending, will require prior to discharge, r/w Thoracic NP, pt and nursing staff  - MDT interrogation wnl   - Regarding anticoagulation for AF, he is s/p TOOTIE clip which should be evaluated with MERI to assess degree of closure. Dr Pal spoke with his cardiologist & EP (Dr. Cox & Richard, respectively) who report that he has not had a MERI after clip. Would be OK with holding AC for now but he should get MERI as outpatient to assess closure to guide anticoagulation management going forward.  - OK to start beta blocker to suppress NSVT and PVCs. consider toprol 25mg qd  - outpt f/u 2 weeks w/ Dr Pal, sooner if needed  - continued plan of care per primary team    will d/w Dr Pal, full recommendations to follow                EP follow up: POD #1 s/p MDT Micra VR ppm via RFV  right groin suture removed without incident       ECG: ventricular paced, underlying AF at 70bpm  TELE: vpaced 70bpm  post op beside POCUS 1/6/2023 negative for pericardial effusion  MDT Interrogation: full report in chart.VVIR 70-110bpm,540ohms/6.4.mV/0.38V@0.24ms  CXR PA/LAT 1/7/2023: prelim +RV septal Micra, left pleural effusion >improving from prior films    MEDICATIONS  (STANDING):  ferrous    sulfate 325 milliGRAM(s) Oral daily  folic acid 1 milliGRAM(s) Oral daily  influenza  Vaccine (HIGH DOSE) 0.7 milliLiter(s) IntraMuscular once  lidocaine   4% Patch 1 Patch Transdermal daily  multivitamin 1 Tablet(s) Oral daily  pantoprazole    Tablet 40 milliGRAM(s) Oral before breakfast  simvastatin 20 milliGRAM(s) Oral at bedtime  sodium chloride 0.9% lock flush 3 milliLiter(s) IV Push every 8 hours  torsemide 20 milliGRAM(s) Oral daily    Vital Signs Last 24 Hrs  T(C): 36.7 (07 Jan 2023 08:06), Max: 37.2 (07 Jan 2023 05:00)  T(F): 98 (07 Jan 2023 08:06), Max: 99 (07 Jan 2023 05:00)  HR: 72 (07 Jan 2023 08:06) (64 - 72)  BP: 127/72 (07 Jan 2023 08:06) (115/54 - 140/74)  BP(mean): 74 (06 Jan 2023 16:25) (74 - 74)  RR: 17 (07 Jan 2023 08:06) (15 - 18)  SpO2: 98% (07 Jan 2023 08:06) (90% - 100%)    Parameters below as of 07 Jan 2023 08:06  Patient On (Oxygen Delivery Method): room air    Physical Exam:  Constitutional: NAD, AAOx3  Cardiovascular: +S1S2 RRR  Pulmonary: unlabored, right CTA, left decreased   Extremities: no pedal edema, +distal pulses b/l  right groin access site suture removed, no bleeding, swelling or hematoma. new dsg placed c/d/i  Neuro: non focal, FIELDS x4    LABS:                        9.3    7.48  )-----------( 230      ( 07 Jan 2023 06:55 )             28.5     01-07    139  |  101  |  25.6<H>  ----------------------------<  104<H>  4.6   |  29.0  |  1.08    Ca    8.7      07 Jan 2023 06:55  Phos  3.6     01-07  Mg     2.1     01-07      A/P:  82 year old male with hypertension, hyperlipidemia, severe mitral regurgitation s/p mitral ring and TOOTIE clip (at [facility] with Dr. Ayoub) and longstanding persistent atrial fibrillation (on Apixaban at admission, rate controlled) who presented to [facility] with dyspnea found to have left hemothorax. He was transferred to [facility] 1/1/23 and is now s/p left pigtail catheter placement 1/1 and removed 1/5. While hospitalized he was noted to have AF with slow ventricular response and periods of regularization consistent with complete heart block and asymptomatic NSVT.  TTE revealed preserved EF. 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  Patient now POD #1 s/p successful and uncomplicated Medtronic Micra leadless pacemaker insertion via right femoral vein.     - right groin suture removed, new dsg placed, can be removed by pt in am. Groin care and restrictions reviewed with pt.  - CXR PA/LAT 1/7/23: prelim +RV septal Micra, left pleural effusion >improving from prior films  - MDT interrogation wnl   - Regarding anticoagulation for AF, he is s/p TOOTIE clip which should be evaluated with MERI to assess degree of closure. Dr Pal spoke with his cardiologist & EP (Dr. Cox & Richard, respectively) who report that he has not had a MERI after clip. Would be OK with holding AC for now but he should get MERI as outpatient to assess closure to guide anticoagulation management going forward.  - OK to start beta blocker to suppress NSVT and PVCs. consider toprol 25mg qd  - outpt f/u 2 weeks w/ Dr Pal, sooner if needed  - continued plan of care per primary team    will d/w Dr Pal, full recommendations to follow

## 2023-01-07 NOTE — PROGRESS NOTE ADULT - ASSESSMENT
82 year old male patient transferred from [facility] 12/31 with spontaneous hemothorax on Eliquis, now s/p left pigtail catheter placement 1/1 and removed 1/5 for drainage. Hospital course significant for mild hypotension secondary to hypovolemia from acute blood loss (s/p 1uPRBC 1/1), as well as Atrial fibrillation w/ RBBB, slow VR and intermittent heart block now s/p Micra PPM 1/6/23.

## 2023-01-07 NOTE — PROGRESS NOTE ADULT - PROBLEM SELECTOR PROBLEM 1
Nontraumatic hemothorax
Elevated troponin
Nontraumatic hemothorax
Elevated troponin
Nontraumatic hemothorax
Nontraumatic hemothorax

## 2023-01-07 NOTE — PROGRESS NOTE ADULT - SUBJECTIVE AND OBJECTIVE BOX
Spontaneous hemothorax s/p left pigtail 1/1 (removed 1/5)    PAST MEDICAL & SURGICAL HISTORY:  HTN (hypertension)  HLD (hyperlipidemia)  H/O mitral valve repair  H/O hernia repair    Brief Hospital Course: 82 year old male patient transferred from [facility] 12/31 with spontaneous hemothorax on Eliquis, now s/p left pigtail catheter placement 1/1 and removed 1/5 for drainage. Hospital course significant for mild hypotension secondary to hypovolemia from acute blood loss (s/p 1uPRBC 1/1), as well as Atrial fibrillation w/ RBBB, slow VR and intermittent heart block now s/p Micra PPM 1/6/23.     Significant recent/past 24 hr events: No overnight events reported. S/p Micra PPM placement yesterday 1/6.      Subjective: Patient lying in bed in NAD. +Tolerating diet. +Passing gas. +Pain currently controlled. Denies fevers, chills, lightheadedness, dizziness, HA, CP, palpitations, SOB, cough, abdominal pain, N/V, diarrhea, numbness/tingling in extremities, or any other acute complaints. ROS negative x 10 systems except as noted above.    MEDICATIONS  (STANDING):  ferrous    sulfate 325 milliGRAM(s) Oral daily  folic acid 1 milliGRAM(s) Oral daily  influenza  Vaccine (HIGH DOSE) 0.7 milliLiter(s) IntraMuscular once  lidocaine   4% Patch 1 Patch Transdermal daily  multivitamin 1 Tablet(s) Oral daily  pantoprazole    Tablet 40 milliGRAM(s) Oral before breakfast  simvastatin 20 milliGRAM(s) Oral at bedtime  sodium chloride 0.9% lock flush 3 milliLiter(s) IV Push every 8 hours  torsemide 20 milliGRAM(s) Oral daily    MEDICATIONS  (PRN):  acetaminophen     Tablet .. 650 milliGRAM(s) Oral every 6 hours PRN Temp greater or equal to 38.5C (101.3F), Mild Pain (1 - 3)  oxyCODONE    IR 5 milliGRAM(s) Oral every 6 hours PRN Severe Pain (7 - 10)    Allergies: spironolactone (Rash)    Vitals   T(C): 36.7 (07 Jan 2023 00:00), Max: 36.8 (06 Jan 2023 05:36)  T(F): 98.1 (07 Jan 2023 00:00), Max: 98.2 (06 Jan 2023 05:36)  HR: 70 (07 Jan 2023 00:00) (51 - 70)  BP: 127/68 (07 Jan 2023 00:00) (111/59 - 140/74)  BP(mean): 74 (06 Jan 2023 16:25) (74 - 74)  RR: 18 (07 Jan 2023 00:00) (14 - 18)  SpO2: 97% (07 Jan 2023 00:00) (90% - 100%)  O2 Parameters below as of 07 Jan 2023 00:00  Patient On (Oxygen Delivery Method): room air    I&O's Detail    06 Jan 2023 07:01  -  07 Jan 2023 05:08  --------------------------------------------------------  IN:  Total IN: 0 mL    OUT:    Voided (mL): 300 mL  Total OUT: 300 mL    Total NET: -300 mL    Physical Exam  Neuro: A+O x 3, non-focal, speech clear and intact  HEENT:  NCAT, No conjuctival edema or icterus, no thrush.    Neck:  Supple, trachea midline  Pulm: CTA bilaterally, no accessory muscle use noted  Chest: Dressing over former pigtail site C/D/I  CV: paced  Abd: soft, NT, ND, + BS  Ext: FIELDS x 4, no edema, no cyanosis, distal motor/neuro/circ intact  Skin: warm, dry, perfused    LABS                        9.2    6.70  )-----------( 215      ( 06 Jan 2023 05:51 )             28.0     01-06    137  |  102  |  25.4<H>  ----------------------------<  92  4.7   |  27.0  |  0.90    Ca    8.6      06 Jan 2023 05:51  Mg     2.1     01-06    Last CXR:  < from: Xray Chest 1 View- PORTABLE-Routine (Xray Chest 1 View- PORTABLE-Routine in AM.) (01.04.23 @ 05:47) >  Left-sided chest tube again noted. Stable opacification leftlung base.   Likely trace left-sided pneumothorax laterally diminished from January 2.   Stable blunting right costophrenic angle.  IMPRESSION: Diminished left-sided pneumothorax.  < end of copied text >

## 2023-01-07 NOTE — PROGRESS NOTE ADULT - PROBLEM SELECTOR PROBLEM 3
ABLA (acute blood loss anemia)
ABLA (acute blood loss anemia)
NSVT (nonsustained ventricular tachycardia)
Atrial fibrillation
Atrial fibrillation
NSVT (nonsustained ventricular tachycardia)
Atrial fibrillation

## 2023-01-07 NOTE — DISCHARGE NOTE PROVIDER - NSDCMRMEDTOKEN_GEN_ALL_CORE_FT
acetaminophen 325 mg oral tablet: 2 tab(s) orally every 6 hours, As needed, Temp greater or equal to 38.5C (101.3F), Mild Pain (1 - 3)  Lasix 20 mg oral tablet: 1 tab(s) orally once a day  oxyCODONE 5 mg oral tablet: 1 tab(s) orally every 6 hours, As needed, Severe Pain (7 - 10) MDD:3 tabs  simvastatin 20 mg oral tablet: 1 tab(s) orally once a day (at bedtime)

## 2023-01-07 NOTE — DISCHARGE NOTE PROVIDER - YES NO FOR MLM POSITIVE OR NEGATIVE COVID RESULT
12/14/2020 12:09 PM    Admit Date: 12/10/2020    Admit Diagnosis: Chest pain [R07.9]; Unstable angina (HCC) [I20.0]      Subjective:   No cp or sob      Objective:      Visit Vitals  BP (!) 108/55 (BP 1 Location: Left arm, BP Patient Position: At rest)   Pulse 60   Temp 98.3 °F (36.8 °C)   Resp 18   Ht 6' (1.829 m)   Wt 148 lb 9.6 oz (67.4 kg)   SpO2 97%   BMI 20.15 kg/m²       Physical Exam:  Nia Feller, Well Nourished, No Acute Distress, Alert & Oriented x 3, appropriate mood. Neck- supple, no JVD  CV- regular rate and rhythm no MRG  Lung- clear bilaterally  Abd- soft, nontender, nondistended  Ext- no edema bilaterally. Skin- warm and dry        Data Review:   Recent Labs     12/14/20  1039 12/14/20  0439    142   K 4.1 3.8   BUN 17 20   CREA 0.83 0.71*   WBC  --  7.1   HGB  --  11.8*   HCT  --  34.3*   PLT  --  217   INR 1.0  --        Assessment/Plan:     Principal Problem:    Coronary artery disease involving coronary bypass graft of native heart with unstable angina pectoris ([facility] (12/10/2020)Stable. Continue current medical therapy.   Start heparin    Active Problems:    Chest pain (12/10/2020)      Bilateral carotid bruits (12/10/2020)      PAD (peripheral artery disease) ([facility] [address] (12/10/2020)      [facility] (12/11/2020)      Type 1 diabetes mellitus with other specified complication ([facility] (06/51/8004) ,

## 2023-01-07 NOTE — DISCHARGE NOTE NURSING/CASE MANAGEMENT/SOCIAL WORK - PATIENT PORTAL LINK FT
You can access the FollowMyHealth Patient Portal offered by [facility] by registering at the following website: [email or web address]. By joining [facility]’s FollowMyHealth portal, you will also be able to view your health information using other applications (apps) compatible with our system.

## 2023-01-07 NOTE — DISCHARGE NOTE PROVIDER - NSDCFUADDAPPT_GEN_ALL_CORE_FT
Follow up Dr Barney next Monday> Call Nataly  at  select option 2  to schedule  Seek medical attention if short of breath, palpitations. pain unrelieved with medication or nausea/vomiting, fever,Do not swim in pool or ocean. Dr Barney will remove sutures at office visit    Cardiology followup 2 weeks  Follow up Dr Barney next Monday> Call Nataly  at  select option 2  to schedule  Seek medical attention if short of breath, palpitations. pain unrelieved with medication or nausea/vomiting, fever,Do not swim in pool or ocean. Dr Barney will remove sutures at office visit    Cardiology followup 2 weeks >Dr Costa Please call to arrange

## 2023-01-07 NOTE — PROGRESS NOTE ADULT - NS ATTEND AMEND GEN_ALL_CORE FT
.  .  Doing well after pacemaker implantation. CXR with appropriate position. Device function WNL.    Will have him remain off of AC but will need outpatient MERI to assess TOOTIE closure. OK to stay on BB for NSVT and PVCs.    OK for discharge with outpatient follow up with me in [place].    Thank you for this consultation. EP will sign off. Please contact EP team with any questions.    Shukri Pal MD  Clinical Cardiac Electrophysiology

## 2023-01-07 NOTE — DISCHARGE NOTE PROVIDER - NSDCPNSUBOBJ_GEN_ALL_CORE
Progress Note:   · Provider Specialty  Thoracic Surgery    Reason for Admission:   Reason for Admission:  · Reason for Admission  Hemothorax      · Subjective and Objective:     Spontaneous hemothorax s/p left pigtail 1/1 (removed 1/5)    PAST MEDICAL & SURGICAL HISTORY:  HTN (hypertension)  HLD (hyperlipidemia)  H/O mitral valve repair  H/O hernia repair    Brief Hospital Course: 82 year old male patient transferred from [facility] 12/31 with spontaneous hemothorax on Eliquis, now s/p left pigtail catheter placement 1/1 and removed 1/5 for drainage. Hospital course significant for mild hypotension secondary to hypovolemia from acute blood loss (s/p 1uPRBC 1/1), as well as Atrial fibrillation w/ RBBB, slow VR and intermittent heart block now s/p Micra PPM 1/6/23.     Significant recent/past 24 hr events: No overnight events reported. S/p Micra PPM placement yesterday 1/6.      Subjective: Patient lying in bed in NAD. +Tolerating diet. +Passing gas. +Pain currently controlled. Denies fevers, chills, lightheadedness, dizziness, HA, CP, palpitations, SOB, cough, abdominal pain, N/V, diarrhea, numbness/tingling in extremities, or any other acute complaints. ROS negative x 10 systems except as noted above.    MEDICATIONS  (STANDING):  ferrous    sulfate 325 milliGRAM(s) Oral daily  folic acid 1 milliGRAM(s) Oral daily  influenza  Vaccine (HIGH DOSE) 0.7 milliLiter(s) IntraMuscular once  lidocaine   4% Patch 1 Patch Transdermal daily  multivitamin 1 Tablet(s) Oral daily  pantoprazole    Tablet 40 milliGRAM(s) Oral before breakfast  simvastatin 20 milliGRAM(s) Oral at bedtime  sodium chloride 0.9% lock flush 3 milliLiter(s) IV Push every 8 hours  torsemide 20 milliGRAM(s) Oral daily    MEDICATIONS  (PRN):  acetaminophen     Tablet .. 650 milliGRAM(s) Oral every 6 hours PRN Temp greater or equal to 38.5C (101.3F), Mild Pain (1 - 3)  oxyCODONE    IR 5 milliGRAM(s) Oral every 6 hours PRN Severe Pain (7 - 10)    Allergies: spironolactone (Rash)    Vitals   T(C): 36.7 (07 Jan 2023 00:00), Max: 36.8 (06 Jan 2023 05:36)  T(F): 98.1 (07 Jan 2023 00:00), Max: 98.2 (06 Jan 2023 05:36)  HR: 70 (07 Jan 2023 00:00) (51 - 70)  BP: 127/68 (07 Jan 2023 00:00) (111/59 - 140/74)  BP(mean): 74 (06 Jan 2023 16:25) (74 - 74)  RR: 18 (07 Jan 2023 00:00) (14 - 18)  SpO2: 97% (07 Jan 2023 00:00) (90% - 100%)  O2 Parameters below as of 07 Jan 2023 00:00  Patient On (Oxygen Delivery Method): room air    I&O's Detail    06 Jan 2023 07:01  -  07 Jan 2023 05:08  --------------------------------------------------------  IN:  Total IN: 0 mL    OUT:    Voided (mL): 300 mL  Total OUT: 300 mL    Total NET: -300 mL    Physical Exam  Neuro: A+O x 3, non-focal, speech clear and intact  HEENT:  NCAT, No conjuctival edema or icterus, no thrush.    Neck:  Supple, trachea midline  Pulm: CTA bilaterally, no accessory muscle use noted  Chest: Dressing over former pigtail site C/D/I  CV: paced  Abd: soft, NT, ND, + BS  Ext: FIELDS x 4, no edema, no cyanosis, distal motor/neuro/circ intact  Skin: warm, dry, perfused    LABS                        9.2    6.70  )-----------( 215      ( 06 Jan 2023 05:51 )             28.0     01-06    137  |  102  |  25.4<H>  ----------------------------<  92  4.7   |  27.0  |  0.90    Ca    8.6      06 Jan 2023 05:51  Mg     2.1     01-06    Last CXR:  < from: Xray Chest 1 View- PORTABLE-Routine (Xray Chest 1 View- PORTABLE-Routine in AM.) (01.04.23 @ 05:47) >  Left-sided chest tube again noted. Stable opacification leftlung base.   Likely trace left-sided pneumothorax laterally diminished from January 2.   Stable blunting right costophrenic angle.  IMPRESSION: Diminished left-sided pneumothorax.  < end of copied text >      Assessment and Plan:   · Assessment    82 year old male patient transferred from [facility] 12/31 with spontaneous hemothorax on Eliquis, now s/p left pigtail catheter placement 1/1 and removed 1/5 for drainage. Hospital course significant for mild hypotension secondary to hypovolemia from acute blood loss (s/p 1uPRBC 1/1), as well as Atrial fibrillation w/ RBBB, slow VR and intermittent heart block now s/p Micra PPM 1/6/23.     Problem/Plan - 1:  ·  Problem: Nontraumatic hemothorax.   ·  Plan: Presented with Spontaneous hemothorax, non traumatic per patient.   S/p Left pigtail catheter placement 1/1 and removal 1/5.   Exudative effusion, culture negative.  Cytology negative for malignant cells.  Cardiology following.  outpatient ischemic work up.  S/p Micra PPM , groin stitch removed   Plan to be discussed with Thoracic Surgery team in AM rounds.    Problem/Plan - 2:  ·  Problem: ABLA (acute blood loss anemia).   ·  Plan: S/p 1u PRBC 1/1 and 1/3 for ABLA. Continue to trend CBC.    Problem/Plan - 3:  ·  Problem: Atrial fibrillation.   ·  Plan: s/p Micra PPM 1/6/23.   discuss with EP hold Eliquis for now  completed  CXR PA/LAT >reviewed by EP    Problem/Plan - 4:  ·  Problem: Need for prophylactic measure.   ·  Plan: SCDs for DVT prophylaxis. Eliquis held   Protonix for GI prophylaxis.      Electronic Signatures:  Rima Spence (PA)  (Signed 07-Jan-2023 05:14)  	Authored: Progress Note, Reason for Admission, Subjective and Objective, Assessment and Plan      Last Updated: 07-Jan-2023 05:14 by Rima Spence (PA)

## 2023-01-07 NOTE — DISCHARGE NOTE PROVIDER - CARE PROVIDERS DIRECT ADDRESSES
,DirectAddress_Unknown,[email or web address] ,DirectAddress_Unknown,[email or web address]

## 2023-01-07 NOTE — PROGRESS NOTE ADULT - PROBLEM SELECTOR PROBLEM 4
Need for prophylactic measure
Intermittent complete heart block
Intermittent complete heart block
Need for prophylactic measure
Need for prophylactic measure

## 2023-01-07 NOTE — PROGRESS NOTE ADULT - PROVIDER SPECIALTY LIST ADULT
Electrophysiology
Electrophysiology
Thoracic Surgery
Cardiology
Thoracic Surgery
Cardiology
Electrophysiology
Thoracic Surgery

## 2023-01-07 NOTE — PROGRESS NOTE ADULT - PROBLEM SELECTOR PLAN 4
SCDs for DVT prophylaxis. Eliquis held   Protonix for GI prophylaxis.
SCDs for DVT prophylaxis. Eliquis held for Micra PPM placement later today.   Protonix for GI prophylaxis.
SCDs for DVT prophylaxis. Eliquis held   Protonix for GI prophylaxis.

## 2023-01-07 NOTE — DISCHARGE NOTE PROVIDER - NSDCCPCAREPLAN_GEN_ALL_CORE_FT
PRINCIPAL DISCHARGE DIAGNOSIS  Diagnosis: Spontaneous pneumothorax  Assessment and Plan of Treatment:        Noe Ortega

## 2023-01-07 NOTE — PROGRESS NOTE ADULT - PROBLEM SELECTOR PLAN 1
Guarded Presented with Spontaneous hemothorax, non traumatic per patient.   S/p Left pigtail catheter placement 1/1 and removal 1/5.   Exudative effusion, culture negative.  Cytology negative for malignant cells.  Cardiology following. Inpatient vs outpatient ischemic work up.  S/p Micra PPM (for PA lateral 1/7, groin stitch to be removed 1/7)   Will discuss timing of restarting Eliquis post procedure.   Plan to be discussed with Thoracic Surgery team in AM rounds.

## 2023-01-08 ENCOUNTER — TRANSCRIPTION ENCOUNTER (OUTPATIENT)
Age: 83
End: 2023-01-08

## 2023-01-08 RX ORDER — FUROSEMIDE 40 MG
1 TABLET ORAL
Qty: 30 | Refills: 0
Start: 2023-01-08 | End: 2023-02-06

## 2023-01-08 RX ORDER — FUROSEMIDE 40 MG
1 TABLET ORAL
Qty: 0 | Refills: 0 | DISCHARGE

## 2023-01-09 ENCOUNTER — TRANSCRIPTION ENCOUNTER (OUTPATIENT)
Age: 83
End: 2023-01-09

## 2023-01-09 PROBLEM — I10 ESSENTIAL (PRIMARY) HYPERTENSION: Chronic | Status: ACTIVE | Noted: 2023-01-01

## 2023-01-09 PROBLEM — E78.5 HYPERLIPIDEMIA, UNSPECIFIED: Chronic | Status: ACTIVE | Noted: 2023-01-01

## 2023-01-23 ENCOUNTER — APPOINTMENT (OUTPATIENT)
Dept: THORACIC SURGERY | Facility: CLINIC | Age: 83
End: 2023-01-23
Payer: MEDICARE

## 2023-01-23 DIAGNOSIS — Z86.79 PERSONAL HISTORY OF OTHER DISEASES OF THE CIRCULATORY SYSTEM: ICD-10-CM

## 2023-01-23 DIAGNOSIS — Z86.39 PERSONAL HISTORY OF OTHER ENDOCRINE, NUTRITIONAL AND METABOLIC DISEASE: ICD-10-CM

## 2023-01-23 DIAGNOSIS — Z95.0 PRESENCE OF CARDIAC PACEMAKER: ICD-10-CM

## 2023-01-23 DIAGNOSIS — J94.2 HEMOTHORAX: ICD-10-CM

## 2023-01-23 PROCEDURE — 99443: CPT | Mod: 95

## 2023-01-23 NOTE — HISTORY OF PRESENT ILLNESS
[Home] : at home, [unfilled] , at the time of the visit. [Medical Office: ([facility])___] : at the medical office located in  [FreeTextEntry1] : Mr. COLEEN SILVEIRA is a 82 year male who presents today for followup. Previously, he has been followed status post pigtail catheter for spontaneous hemothorax. \par \par From inpatient course: 82 year old male patient transferred from [facility] 12/31 with spontaneous hemothorax on Eliquis, now s/p left pigtail catheter placement 1/1 and removed 1/5 for drainage. Hospital course significant for mild hypotension secondary to hypovolemia from acute blood loss (s/p 1uPRBC 1/1), as well as Atrial fibrillation w/ RBBB, slow VR and intermittent heart block now s/p Micra PPM 1/6/23.\par \par Today the patient reports via telephone call due to technical difficulties with telemedicine. \par

## 2023-01-23 NOTE — DATA REVIEWED
[FreeTextEntry1] : Thao Accession Number : [id number]\par Patient:   COLEEN SILVEIRA\par \par Accession:                             [id number]\par \par Collected Date/Time:                   1/3/2023 09:40 EST\par Received Date/Time:                    1/3/2023 16:22 EST\par \par Non-Gynecologic Report - Auth (Verified)\par \par Specimen(s) Submitted\par PLEURAL FLUID, LEFT\par \par Final Diagnosis\par PLEURAL FLUID, LEFT\par NEGATIVE FOR MALIGNANT CELLS.\par \par Cytology slides and  cell block consist of scattered reactive mesothelial\par cells and macrophages in a background of mixed inflammatory cells and\par \par blood.\par Screened by: Beverly Benoit Stomber CT(ASCP)\par Verified by: Lilliana Conteh MD\par (Electronic Signature)\par \par \par \par CT Chest No Contrast at [facility] on 1/3/23:\par IMPRESSION:\par Small loculated left hydropneumothorax containing pigtail catheter. The left pleural effusion has markedly decreased in size when compared to the previous exam.

## 2023-01-23 NOTE — ASSESSMENT
[FreeTextEntry1] : Jay Jay is an 82-year-old male with a recent hospital admission for a spontaneous pneumothorax that was drained by chest tube.  He was discharged home without issue and complains now of only shortness of breath with exertion.  I will be arranging a chest x-ray in the near future to ensure no reaccumulation and follow-up with him shortly thereafter.\par \par Thank you for allowing me to participate in the care of your patient.\par \par 30 minutes was spent during this encounter.\par \par Abdiel Barney MD\par Department of Cardiovascular and Thoracic Surgery\par \par [employer] [facility]\par

## 2023-01-24 ENCOUNTER — NON-APPOINTMENT (OUTPATIENT)
Age: 83
End: 2023-01-24

## 2023-01-25 ENCOUNTER — RESULT REVIEW (OUTPATIENT)
Age: 83
End: 2023-01-25

## 2023-01-30 ENCOUNTER — APPOINTMENT (OUTPATIENT)
Dept: THORACIC SURGERY | Facility: CLINIC | Age: 83
End: 2023-01-30
Payer: MEDICARE

## 2023-01-30 PROCEDURE — 99443: CPT | Mod: 95

## 2023-01-30 NOTE — REASON FOR VISIT
[Home] : at home, [unfilled] , at the time of the visit. [Medical Office: ([facility])___] : at the medical office located in  [Verbal consent obtained from patient] : the patient, [unfilled] [Follow-Up: _____] : a [unfilled] follow-up visit

## 2023-01-30 NOTE — DATA REVIEWED
[FreeTextEntry1] : EXAM: RAD [id number] _ XRAY EXAM CHEST [id number]\par \par \par PROCEDURE DATE:  Jan 25 2023\par \par \par PA and lateral chest radiographs\par \par COMPARISON: 1/7/2023 chest x-ray and chest CT scan 1/3/2023.\par \par CLINICAL INFORMATION: Pneumothorax.\par \par FINDINGS:\par \par PULMONARY: The airway is midline.\par On lateral projection the posterior LEFT diaphragmatic contour is partially\par ill-defined indicating residual small patchy adjacent airspace disease.\par Remaining lung parenchyma clear gross airspace consolidations or effusions.\par \par HEART/VASCULAR: The  heart is enlarged in transverse diameter. Status post\par cardiac valve replacement and atrial appendage clipping procedure.\par .\par \par BONES: The visualized osseous structures are intact.\par \par IMPRESSION:\par \par No radiographic evidence of active chest disease..\par \par --- End of Report ---\par \par \par \par

## 2023-01-30 NOTE — ASSESSMENT
[FreeTextEntry1] : Jay Jay is an 82-year-old male with a history of a spontaneous pneumothorax that was treated by chest tube drainage.  He has done well and a recent chest x-ray demonstrates no evidence of effusion.  He can see me on a as needed basis going forward.\par \par Thank you for allowing me to participate in the care of your patient.\par \par 30 minutes was spent during this encounter.\par \par Abdiel Barney MD\par Department of Cardiovascular and Thoracic Surgery\par \par [employer] [employer] at [facility]\par